# Patient Record
Sex: MALE | Race: WHITE | NOT HISPANIC OR LATINO | Employment: FULL TIME | URBAN - METROPOLITAN AREA
[De-identification: names, ages, dates, MRNs, and addresses within clinical notes are randomized per-mention and may not be internally consistent; named-entity substitution may affect disease eponyms.]

---

## 2017-01-21 ENCOUNTER — PATIENT MESSAGE (OUTPATIENT)
Dept: CARDIOLOGY | Facility: CLINIC | Age: 62
End: 2017-01-21

## 2017-01-23 NOTE — TELEPHONE ENCOUNTER
Pt notified that Artorvastatin 20mg was sent to his pharmacy on 11/25/16 w/6 refills. Pt stated that he will contact his pharmacy and will call us if he needs anything.

## 2017-02-06 ENCOUNTER — PATIENT MESSAGE (OUTPATIENT)
Dept: CARDIOLOGY | Facility: CLINIC | Age: 62
End: 2017-02-06

## 2017-02-17 ENCOUNTER — LAB VISIT (OUTPATIENT)
Dept: LAB | Facility: HOSPITAL | Age: 62
End: 2017-02-17
Attending: INTERNAL MEDICINE
Payer: COMMERCIAL

## 2017-02-17 DIAGNOSIS — R93.1 AGATSTON CAC SCORE 200-399: ICD-10-CM

## 2017-02-17 LAB
ALBUMIN SERPL BCP-MCNC: 3.9 G/DL
ALP SERPL-CCNC: 90 U/L
ALT SERPL W/O P-5'-P-CCNC: 24 U/L
AST SERPL-CCNC: 27 U/L
BILIRUB DIRECT SERPL-MCNC: 0.2 MG/DL
BILIRUB SERPL-MCNC: 0.7 MG/DL
CHOLEST/HDLC SERPL: 3.2 {RATIO}
HDL/CHOLESTEROL RATIO: 31.5 %
HDLC SERPL-MCNC: 162 MG/DL
HDLC SERPL-MCNC: 51 MG/DL
LDLC SERPL CALC-MCNC: 77.6 MG/DL
NONHDLC SERPL-MCNC: 111 MG/DL
PROT SERPL-MCNC: 7.2 G/DL
TRIGL SERPL-MCNC: 167 MG/DL

## 2017-02-17 PROCEDURE — 80061 LIPID PANEL: CPT

## 2017-02-17 PROCEDURE — 36415 COLL VENOUS BLD VENIPUNCTURE: CPT | Mod: PO

## 2017-02-17 PROCEDURE — 80076 HEPATIC FUNCTION PANEL: CPT

## 2017-02-20 ENCOUNTER — TELEPHONE (OUTPATIENT)
Dept: CARDIOLOGY | Facility: CLINIC | Age: 62
End: 2017-02-20

## 2017-02-20 NOTE — TELEPHONE ENCOUNTER
----- Message from Shae Mahmood MD sent at 2/17/2017  5:28 PM CST -----  Mr. Schoen,   please review results of your lipd profile.  LDL-C has markedly improved and it is within the goal. However your TG_c are slightly higher than 150 ( upper normal threshold).  I suggest 1000mg Fish Oils at night and decrease carb/starches in your diet.  I would like to see you in a follow up even though initially we have recommended only PCP follow up.  We will make an appointment for you in the next month or two if this is OK with you

## 2017-05-03 ENCOUNTER — PATIENT MESSAGE (OUTPATIENT)
Dept: CARDIOLOGY | Facility: CLINIC | Age: 62
End: 2017-05-03

## 2017-06-15 DIAGNOSIS — R93.1 AGATSTON CAC SCORE 200-399: ICD-10-CM

## 2017-06-15 RX ORDER — ATORVASTATIN CALCIUM 20 MG/1
20 TABLET, FILM COATED ORAL DAILY
Qty: 30 TABLET | Refills: 6 | Status: SHIPPED | OUTPATIENT
Start: 2017-06-15 | End: 2017-09-14 | Stop reason: DRUGHIGH

## 2017-07-24 DIAGNOSIS — I25.10 CORONARY ARTERY DISEASE INVOLVING NATIVE CORONARY ARTERY OF NATIVE HEART WITHOUT ANGINA PECTORIS: Primary | ICD-10-CM

## 2017-07-28 DIAGNOSIS — I25.10 CORONARY ATHEROSCLEROSIS OF UNSPECIFIED TYPE OF VESSEL, NATIVE OR GRAFT: ICD-10-CM

## 2017-07-28 DIAGNOSIS — Z98.61 POSTSURGICAL PERCUTANEOUS TRANSLUMINAL CORONARY ANGIOPLASTY STATUS: Primary | ICD-10-CM

## 2017-08-01 ENCOUNTER — OFFICE VISIT (OUTPATIENT)
Dept: CARDIOLOGY | Facility: CLINIC | Age: 62
End: 2017-08-01
Payer: COMMERCIAL

## 2017-08-01 VITALS
SYSTOLIC BLOOD PRESSURE: 106 MMHG | OXYGEN SATURATION: 97 % | HEART RATE: 61 BPM | WEIGHT: 187.63 LBS | BODY MASS INDEX: 25.41 KG/M2 | HEIGHT: 72 IN | DIASTOLIC BLOOD PRESSURE: 68 MMHG

## 2017-08-01 DIAGNOSIS — I25.10 CORONARY ARTERY DISEASE INVOLVING NATIVE CORONARY ARTERY OF NATIVE HEART WITHOUT ANGINA PECTORIS: ICD-10-CM

## 2017-08-01 DIAGNOSIS — I21.21: ICD-10-CM

## 2017-08-01 PROCEDURE — 99999 PR PBB SHADOW E&M-EST. PATIENT-LVL III: CPT | Mod: PBBFAC,,, | Performed by: INTERNAL MEDICINE

## 2017-08-01 PROCEDURE — 99244 OFF/OP CNSLTJ NEW/EST MOD 40: CPT | Mod: S$GLB,,, | Performed by: INTERNAL MEDICINE

## 2017-08-01 RX ORDER — TICAGRELOR 90 MG/1
90 TABLET ORAL 2 TIMES DAILY
Qty: 180 TABLET | Refills: 5 | Status: SHIPPED | OUTPATIENT
Start: 2017-08-01 | End: 2018-07-17 | Stop reason: ALTCHOICE

## 2017-08-01 RX ORDER — TICAGRELOR 90 MG/1
90 TABLET ORAL 2 TIMES DAILY
Refills: 0 | COMMUNITY
Start: 2017-07-22 | End: 2017-08-01 | Stop reason: SDUPTHER

## 2017-08-01 RX ORDER — METOPROLOL TARTRATE 25 MG/1
25 TABLET, FILM COATED ORAL 2 TIMES DAILY
Refills: 0 | COMMUNITY
Start: 2017-07-22 | End: 2018-01-30 | Stop reason: ALTCHOICE

## 2017-08-01 RX ORDER — ASPIRIN 81 MG/1
81 TABLET ORAL DAILY
Refills: 0 | COMMUNITY
Start: 2017-07-22 | End: 2023-04-05

## 2017-08-01 RX ORDER — LISINOPRIL 20 MG/1
20 TABLET ORAL DAILY
COMMUNITY
End: 2018-06-17 | Stop reason: SDUPTHER

## 2017-08-01 NOTE — PROGRESS NOTES
Subjective:    Patient ID:  Gerard L Schoen III is a 61 y.o. male who presents for evaluation of Coronary Artery Disease      Mr. Schoen had been seen previously for primary prevention. He was found to have a Ca score >200 but < 300. A nuclear stress test last November was negative. In mid [place] he and his wife relaxed on the bed and fell asleep. He had only complaints of heart burn before laying down. She awoke to find him in cardiac arrest. She performed CPR and called 911. He was shocked in the field and resuscitated. Brought to [facility] where he was found to have a CIrc infarct. Underwent primary PCI.Hosptialized for one week. EF reported to be 35% by LV gram.  Since discharge he denies chest pain and pressure. He has no heartburn. No dyspnea or shortness of breath. Denies PND and orthopnea. No edema.      Coronary Artery Disease   Presents for initial visit. The disease course has been improving. The condition has lasted for 3 weeks. Pertinent negatives include no chest pain, dizziness, leg swelling, palpitations, shortness of breath or weight gain. Risk factors include stress. Past treatments include statins and aspirin. Compliance with prior treatments has been good. His past medical history is significant for past myocardial infarction. Past surgical history includes angioplasty and cardiac stents.       Review of Systems   Constitution: Negative for decreased appetite, fever, malaise/fatigue, weight gain and weight loss.   HENT: Negative for congestion, headaches, hoarse voice and sore throat.    Eyes: Negative for blurred vision, vision loss in left eye, vision loss in right eye, visual disturbance and visual halos.   Cardiovascular: Negative for chest pain, claudication, dyspnea on exertion, irregular heartbeat, leg swelling, near-syncope, orthopnea and palpitations.   Respiratory: Negative for cough, shortness of breath and snoring.     Hematologic/Lymphatic: Negative for bleeding problem. Does not bruise/bleed easily.   Skin: Negative for color change and itching.   Musculoskeletal: Negative for falls, muscle cramps and myalgias.   Gastrointestinal: Negative for abdominal pain, change in bowel habit, bowel incontinence, heartburn, nausea and vomiting.   Genitourinary: Negative for flank pain.   Neurological: Positive for difficulty with concentration. Negative for excessive daytime sleepiness, dizziness, focal weakness, light-headedness, loss of balance, sensory change and vertigo.        Short term memory problems   Psychiatric/Behavioral: Negative for depression. The patient does not have insomnia.          /68 (BP Location: Left arm, Patient Position: Sitting, BP Method: Automatic)   Pulse 61   Ht 6' (1.829 m)   Wt 85.1 kg (187 lb 9.8 oz)   SpO2 97%   BMI 25.44 kg/m²     Objective:    Physical Exam   Constitutional: He is oriented to person, place, and time. He appears well-developed and well-nourished.   HENT:   Head: Normocephalic and atraumatic.   Eyes: Pupils are equal, round, and reactive to light.   Neck: Neck supple. No JVD present. Carotid bruit is not present.   Cardiovascular: Normal rate, regular rhythm, S1 normal, S2 normal and normal pulses.   No extrasystoles are present. PMI is not displaced.  Exam reveals no gallop and no friction rub.    No murmur heard.  Pulmonary/Chest: Effort normal and breath sounds normal. No respiratory distress. He has no wheezes. He has no rales.   Abdominal: Soft. Bowel sounds are normal.   Neurological: He is alert and oriented to person, place, and time. He has normal strength. No cranial nerve deficit.   Skin: Skin is warm and dry.   Psychiatric: He has a normal mood and affect. His speech is normal. Thought content normal.     I personally reviewed the angiogram. Total occlusion of the Circ treated with PCI with good result. There is moderate disease 50-60% of the mid LAD and proximal  RCA. LV gram does show moderately depressed LVEF.  I reviewed all the provided hospital records.      Assessment:       1. Coronary artery disease involving native coronary artery of native heart without angina pectoris    2. Myocardial infarction involving left circumflex coronary artery         Plan:       I would continue aspirin and ticagrelor. I explained to the patient and his wife about superior clinical results.  Continue high intensity statin.  Enrollment in cardiac rehab.  Follow up Echo cardiogram to assess LVEF. Not currently on Life Vest.  I advised him to start cardiac rehab prior to return to work on August 14 for this patient who presented after cardiac arrest complicating his MI.

## 2017-08-01 NOTE — PROGRESS NOTES
Cardiology Clinic Note  Reason for Visit: ***  Referring MD:    HPI:   ***    ROS:    Constitution: Negative for fever or chills. Negative for weight loss or gain.   HENT: Negative for sore throat or headaches. Negative for rhinorrhea.  Eyes: Negative for blurred or double vision.   Cardiovascular: See above  Pulmonary: Negative for SOB. Negative for cough.   Gastrointestinal: Negative for abdominal pain. Negative for nausea/ vomiting. Negative for diarrhea.   : Negative for dysuria.   Neurological: Negative for focal weakness or sensory changes.  PMH:     Past Medical History:   Diagnosis Date    GERD (gastroesophageal reflux disease)      Past Surgical History:   Procedure Laterality Date    COLONOSCOPY  2008    diverticulosis     Allergies:   Review of patient's allergies indicates:  No Known Allergies  Medications:     Current Outpatient Prescriptions on File Prior to Visit   Medication Sig Dispense Refill    atorvastatin (LIPITOR) 20 MG tablet TAKE 1 TABLET (20 MG TOTAL) BY MOUTH ONCE DAILY. (Patient taking differently: Take 40 mg by mouth once daily. ) 30 tablet 6    Lactobacillus rhamnosus GG (CULTURELLE) 10 billion cell capsule Take 1 capsule by mouth once daily.      pantoprazole (PROTONIX) 40 MG tablet Take 1 tablet (40 mg total) by mouth 2 (two) times daily. 180 tablet 3     No current facility-administered medications on file prior to visit.      Social History:     Social History   Substance Use Topics    Smoking status: Former Smoker     Types: Cigarettes     Quit date: 2006    Smokeless tobacco: Former User    Alcohol use Yes      Comment: on vacations     Family History:     Family History   Problem Relation Age of Onset    Cancer Father     Cancer Mother      lymphoma    Cancer Sister      lymphoma    Coronary artery disease Paternal Grandfather     Cancer Maternal Grandfather      lung    Colon cancer Neg Hx     Cirrhosis Neg Hx     Celiac disease Neg Hx     Crohn's disease Neg  Hx     Ulcerative colitis Neg Hx     Esophageal cancer Neg Hx     Stomach cancer Neg Hx     Rectal cancer Neg Hx     Liver cancer Neg Hx     Irritable bowel syndrome Neg Hx      Physical Exam:   /68 (BP Location: Left arm, Patient Position: Sitting, BP Method: Automatic)   Pulse 61   Ht 6' (1.829 m)   Wt 85.1 kg (187 lb 9.8 oz)   SpO2 97%   BMI 25.44 kg/m²      Constitutional: No acute distress, conversant  HEENT: Sclera anicteric, Pupils equal, round and reactive to light, extraocular motions intact, Oropharynx clear  Neck: No JVD, no carotid bruits  Cardiovascular: regular rate and rhythm, no murmur, rubs or gallops, normal S1/S2  Pulmonary: Clear to auscultation bilaterally  Abdominal: Abdomen soft, nontender, nondistended, positive bowel sounds  Extremities: No lower extremity edema,   Pulses: 2+ BL Radial, 2+ BL carotid, 2+ BL DP, 2+ BL PT, normal Allens Test BL  Skin: No ecchymosis, erythema, or ulcers  Psych: Alert and oriented x 3, appropriate affect  Neuro: CNII-XII intact, no focal deficits    Labs:     Lab Results   Component Value Date     11/08/2016    K 4.5 11/08/2016     11/08/2016    CO2 28 11/08/2016    BUN 19 11/08/2016    CREATININE 1.1 11/08/2016    ANIONGAP 8 11/08/2016     Lab Results   Component Value Date    AST 27 02/17/2017    ALT 24 02/17/2017    ALKPHOS 90 02/17/2017    BILITOT 0.7 02/17/2017    BILIDIR 0.2 02/17/2017    ALBUMIN 3.9 02/17/2017     Lab Results   Component Value Date    CALCIUM 9.1 11/08/2016     No results found for: BNP, BNPTRIAGEBLO Lab Results   Component Value Date    WBC 4.85 11/08/2016    HGB 14.3 11/08/2016    HCT 43.1 11/08/2016     11/08/2016    GRAN 2.6 11/08/2016    GRAN 53.7 11/08/2016     No results found for: PTT, INR  Lab Results   Component Value Date    CHOL 162 02/17/2017    HDL 51 02/17/2017    LDLCALC 77.6 02/17/2017    TRIG 167 (H) 02/17/2017     No results found for: HGBA1C  Lab Results   Component Value Date    TSH  2.009 11/08/2016          Imaging:    ***     EF   Date Value Ref Range Status   11/26/2014 65 55 - 65        EKG: ***  Assessment:    ***     Plan:   ***    Signed:  Eileen Lewis MD  Interventional Cardiology Fellow  [phone]  8/1/2017 2:44 PM    Follow-up:   Future Appointments  Date Time Provider Department Center   8/3/2017 8:35 AM LAB, APPOINTMENT [place] [facility]   8/3/2017 9:15 AM CPX [facility]   8/8/2017 9:30 AM REHAB, CARDIAC [facility]

## 2017-08-01 NOTE — LETTER
August 1, 2017        Ata Montana MD  [address]  [place] [address]             [facility]  [address]  [facility] [address]  Phone: [phone]   Patient: Gerard L Schoen III   MR Number: [id number]   YOB: 1955   Date of Visit: 8/1/2017       Dear Dr. Montana:    Thank you for referring Gerard Schoen to me for evaluation. Below are the relevant portions of my assessment and plan of care.        1. Coronary artery disease involving native coronary artery of native heart without angina pectoris    2. Myocardial infarction involving left circumflex coronary artery            I would continue aspirin and ticagrelor. I explained to the patient and his wife about superior clinical results.  Continue high intensity statin.  Enrollment in cardiac rehab.  Follow up Echo cardiogram to assess LVEF. Not currently on Life Vest.  I advised him to start cardiac rehab prior to return to work on August 14 for this patient who presented after cardiac arrest complicating his MI.      If you have questions, please do not hesitate to call me. I look forward to following Remberto along with you.    Sincerely,      Morteza Ramos MD           CC  No Recipients

## 2017-08-03 ENCOUNTER — HOSPITAL ENCOUNTER (OUTPATIENT)
Dept: CARDIOLOGY | Facility: CLINIC | Age: 62
Discharge: HOME | End: 2017-08-03
Payer: COMMERCIAL

## 2017-08-03 DIAGNOSIS — I25.10 CORONARY ATHEROSCLEROSIS OF UNSPECIFIED TYPE OF VESSEL, NATIVE OR GRAFT: ICD-10-CM

## 2017-08-03 DIAGNOSIS — Z98.61 POSTSURGICAL PERCUTANEOUS TRANSLUMINAL CORONARY ANGIOPLASTY STATUS: ICD-10-CM

## 2017-08-03 LAB — DIASTOLIC DYSFUNCTION: NO

## 2017-08-03 PROCEDURE — 94621 CARDIOPULM EXERCISE TESTING: CPT | Mod: S$GLB,,, | Performed by: INTERNAL MEDICINE

## 2017-08-07 ENCOUNTER — TELEPHONE (OUTPATIENT)
Dept: CARDIAC REHAB | Facility: CLINIC | Age: 62
End: 2017-08-07

## 2017-08-08 ENCOUNTER — CLINICAL SUPPORT (OUTPATIENT)
Dept: CARDIAC REHAB | Facility: CLINIC | Age: 62
End: 2017-08-08
Payer: COMMERCIAL

## 2017-08-08 VITALS
OXYGEN SATURATION: 98 % | SYSTOLIC BLOOD PRESSURE: 122 MMHG | DIASTOLIC BLOOD PRESSURE: 68 MMHG | HEART RATE: 55 BPM | RESPIRATION RATE: 18 BRPM

## 2017-08-08 DIAGNOSIS — Z98.61 POSTSURGICAL PERCUTANEOUS TRANSLUMINAL CORONARY ANGIOPLASTY STATUS: ICD-10-CM

## 2017-08-08 DIAGNOSIS — I25.10 CORONARY ARTERY DISEASE INVOLVING NATIVE CORONARY ARTERY OF NATIVE HEART WITHOUT ANGINA PECTORIS: ICD-10-CM

## 2017-08-08 DIAGNOSIS — I25.10 CORONARY ATHEROSCLEROSIS OF UNSPECIFIED TYPE OF VESSEL, NATIVE OR GRAFT: ICD-10-CM

## 2017-08-08 PROCEDURE — 99999 PR PBB SHADOW E&M-EST. PATIENT-LVL III: CPT | Mod: PBBFAC,,,

## 2017-08-08 PROCEDURE — 93798 PHYS/QHP OP CAR RHAB W/ECG: CPT | Mod: S$GLB,,, | Performed by: INTERNAL MEDICINE

## 2017-08-08 NOTE — PROGRESS NOTES
Patient here for Phase II Cardiac Rehab orientation accompanied by his wife.      Discussed QOL, Risk factors & goals with patient.  Head to toe nursing assessment done.  Vital signs obtained.  No edema noted bilaterally.  Patient reports to have some fatigue & is currently not exercising.    QOL:  Discussed Vinnie & SF36 Questionnaire scores with patient.  He reports to have good family support & reports minimal stress at present time.  He states that he does experience some stress at work, but is currently not back to work as of now.  Patient has been instructed to notify staff in the event that circumstances change or stress increases.  Patient verbalizes understanding.    RISK FACTORS:  The following risk factors are present:  hyperlipidemia, hypertension, positive family history, sedentary lifestyle, stress    GOALS:  Patient has set the following goals:  Decrease cholesterol level  Increase exercise tolerance  Increase knowledge of CAD  ID & manage personal areas of stress    Discussed Cardiac Rehab program in depth with patient.  Medication list updated per patient & marked as reviewed.  Patient has been instructed to notify staff of any problems while attending rehab (ie: chest pain, shortness of breath, lightheadedness, dizziness).  Patient has been instructed to monitor blood pressure readings outside of rehab & to keep a daily log of the readings.  Patient verbalizes understanding.    Kathia Brown RN  Cardiac Rehab Nurse

## 2017-08-08 NOTE — PROGRESS NOTES
Exercise:  Met with the patient for orientation.  Consent forms were signed, entry CPX test results were reviewed, proper attire and shoes were discussed, and strength assessment was performed.         Entry CPX test results included weight (185 lb), abdominal girth (39.5 in), BMI (25.09), and body composition (21.8%).  An estimated MET Level of 12.0 and a Peak VO2 of 24.6ml/kg/min (7.0 actual METS) were measured.  This places the patient in the low risk category.  Upon exit CPX an estimated MET Level of 15.6 will be desired to achieve the goal of a 30% improvement.     Based on entry CPX test, the target heart rate range for patient is 95 to 114 bpm.  The patient will attend cardiac rehab class 3 times per week which will include both aerobic and resistance training which will be modified to fit limitations.  Aerobic exercise will be 30-60 minutes with a goal intensity of 12-15 on the RPE scale.  Resistance training will incorporate free weights 1-2 sets, 10-15 repetitions with a beginning weight of 5 to 10 pounds based on strength assessment.    There were no limitations noted by patient.  The patient stated he is currently walking in his neighborhood 5 days/week for 60 minutes.  Patient was encouraged to continue exercising aerobically and to prepare to exercise at least two additional days per week outside of attending cardiac rehab class for a minimum of 30 minutes.  The patient stated understanding.      The patient will begin Cardiac Rehab on Wednesday, August 16 at 7:00am.    Exercise prescription will be adjusted based on tolerance of exercise intensity by patient.    Jose Martin Romero., CEP

## 2017-08-08 NOTE — PROGRESS NOTES
Met with Gerard L Schoen III for orientation to Phase II cardiac rehab.      Weight: 185 lbs  BMI: 25.09  Abdominal girth: 39.5 inches  Body fat: 21.8%    Pt's goal weight is ~180 lbs.    Labs reviewed with patient. Patient confirms he is taking atorvastatin for cholesterol control.    Lab Results   Component Value Date    CHOL 129 08/03/2017     Lab Results   Component Value Date    HDL 45 08/03/2017     Lab Results   Component Value Date    LDLCALC 58.0 (L) 08/03/2017     Lab Results   Component Value Date    TRIG 130 08/03/2017     Lab Results   Component Value Date    CHOLHDL 34.9 08/03/2017         Lab Results   Component Value Date    GLUF 95 08/03/2017     No results found for: HGBA1C    Vitamins/supplements: none    Patient eats 2 meals daily-usually skips lunch.  Pt may prepare breakfast only at home. Pt eats out for most meals. Denies use of a salt shaker at the table on prepared foods. Dines out at least meals per week at restaurants such as [employer] and [employer].  Chooses fried foods 1x/week. Pt does not eat vegetables daily-will eat vegetables grilled or salads. Pt does not like fish. Beverages include water, milk.  Alcohol: beer occasionally.    3-day food record given to patient to complete and return for nutritional assessment.  Will follow Gerard L Schoen III while enrolled in Phase II cardiac rehab and encourage compliance to 2 gram sodium, Mediterranean style eating.      Yecenia Baez MS, RD, LDN

## 2017-08-16 ENCOUNTER — CLINICAL SUPPORT (OUTPATIENT)
Dept: CARDIAC REHAB | Facility: CLINIC | Age: 62
End: 2017-08-16
Payer: COMMERCIAL

## 2017-08-16 DIAGNOSIS — Z98.61 POSTSURGICAL PERCUTANEOUS TRANSLUMINAL CORONARY ANGIOPLASTY STATUS: ICD-10-CM

## 2017-08-16 DIAGNOSIS — I25.10 CORONARY ATHEROSCLEROSIS OF UNSPECIFIED TYPE OF VESSEL, NATIVE OR GRAFT: ICD-10-CM

## 2017-08-16 PROCEDURE — 93798 PHYS/QHP OP CAR RHAB W/ECG: CPT | Mod: S$GLB,,, | Performed by: INTERNAL MEDICINE

## 2017-08-18 ENCOUNTER — CLINICAL SUPPORT (OUTPATIENT)
Dept: CARDIAC REHAB | Facility: CLINIC | Age: 62
End: 2017-08-18
Payer: COMMERCIAL

## 2017-08-18 DIAGNOSIS — I25.10 CORONARY ATHEROSCLEROSIS OF UNSPECIFIED TYPE OF VESSEL, NATIVE OR GRAFT: ICD-10-CM

## 2017-08-18 DIAGNOSIS — Z98.61 POSTSURGICAL PERCUTANEOUS TRANSLUMINAL CORONARY ANGIOPLASTY STATUS: ICD-10-CM

## 2017-08-18 PROCEDURE — 93798 PHYS/QHP OP CAR RHAB W/ECG: CPT | Mod: S$GLB,,, | Performed by: INTERNAL MEDICINE

## 2017-08-21 ENCOUNTER — CLINICAL SUPPORT (OUTPATIENT)
Dept: CARDIAC REHAB | Facility: CLINIC | Age: 62
End: 2017-08-21
Payer: COMMERCIAL

## 2017-08-21 DIAGNOSIS — Z98.61 POSTSURGICAL PERCUTANEOUS TRANSLUMINAL CORONARY ANGIOPLASTY STATUS: ICD-10-CM

## 2017-08-21 DIAGNOSIS — I25.10 CORONARY ATHEROSCLEROSIS OF UNSPECIFIED TYPE OF VESSEL, NATIVE OR GRAFT: ICD-10-CM

## 2017-08-21 PROCEDURE — 93798 PHYS/QHP OP CAR RHAB W/ECG: CPT | Mod: S$GLB,,, | Performed by: INTERNAL MEDICINE

## 2017-08-23 ENCOUNTER — CLINICAL SUPPORT (OUTPATIENT)
Dept: CARDIAC REHAB | Facility: CLINIC | Age: 62
End: 2017-08-23
Payer: COMMERCIAL

## 2017-08-23 DIAGNOSIS — I25.10 CORONARY ATHEROSCLEROSIS OF UNSPECIFIED TYPE OF VESSEL, NATIVE OR GRAFT: ICD-10-CM

## 2017-08-23 DIAGNOSIS — Z98.61 POSTSURGICAL PERCUTANEOUS TRANSLUMINAL CORONARY ANGIOPLASTY STATUS: ICD-10-CM

## 2017-08-23 PROCEDURE — 93798 PHYS/QHP OP CAR RHAB W/ECG: CPT | Mod: S$GLB,,, | Performed by: INTERNAL MEDICINE

## 2017-08-25 ENCOUNTER — CLINICAL SUPPORT (OUTPATIENT)
Dept: CARDIAC REHAB | Facility: CLINIC | Age: 62
End: 2017-08-25
Payer: COMMERCIAL

## 2017-08-25 DIAGNOSIS — I25.10 CORONARY ATHEROSCLEROSIS OF UNSPECIFIED TYPE OF VESSEL, NATIVE OR GRAFT: ICD-10-CM

## 2017-08-25 DIAGNOSIS — Z98.61 POSTSURGICAL PERCUTANEOUS TRANSLUMINAL CORONARY ANGIOPLASTY STATUS: ICD-10-CM

## 2017-08-25 PROCEDURE — 93798 PHYS/QHP OP CAR RHAB W/ECG: CPT | Mod: S$GLB,,, | Performed by: INTERNAL MEDICINE

## 2017-08-28 ENCOUNTER — CLINICAL SUPPORT (OUTPATIENT)
Dept: CARDIAC REHAB | Facility: CLINIC | Age: 62
End: 2017-08-28
Payer: COMMERCIAL

## 2017-08-28 DIAGNOSIS — I25.10 CORONARY ATHEROSCLEROSIS OF UNSPECIFIED TYPE OF VESSEL, NATIVE OR GRAFT: ICD-10-CM

## 2017-08-28 DIAGNOSIS — Z98.61 POSTSURGICAL PERCUTANEOUS TRANSLUMINAL CORONARY ANGIOPLASTY STATUS: ICD-10-CM

## 2017-08-28 PROCEDURE — 93798 PHYS/QHP OP CAR RHAB W/ECG: CPT | Mod: S$GLB,,, | Performed by: INTERNAL MEDICINE

## 2017-08-30 ENCOUNTER — CLINICAL SUPPORT (OUTPATIENT)
Dept: CARDIAC REHAB | Facility: CLINIC | Age: 62
End: 2017-08-30
Payer: COMMERCIAL

## 2017-08-30 DIAGNOSIS — I25.10 CORONARY ATHEROSCLEROSIS OF UNSPECIFIED TYPE OF VESSEL, NATIVE OR GRAFT: ICD-10-CM

## 2017-08-30 DIAGNOSIS — Z98.61 POSTSURGICAL PERCUTANEOUS TRANSLUMINAL CORONARY ANGIOPLASTY STATUS: ICD-10-CM

## 2017-08-30 PROCEDURE — 93798 PHYS/QHP OP CAR RHAB W/ECG: CPT | Mod: S$GLB,,, | Performed by: INTERNAL MEDICINE

## 2017-09-01 ENCOUNTER — CLINICAL SUPPORT (OUTPATIENT)
Dept: CARDIAC REHAB | Facility: CLINIC | Age: 62
End: 2017-09-01
Payer: COMMERCIAL

## 2017-09-01 DIAGNOSIS — I25.10 CORONARY ATHEROSCLEROSIS OF UNSPECIFIED TYPE OF VESSEL, NATIVE OR GRAFT: ICD-10-CM

## 2017-09-01 DIAGNOSIS — Z98.61 POSTSURGICAL PERCUTANEOUS TRANSLUMINAL CORONARY ANGIOPLASTY STATUS: ICD-10-CM

## 2017-09-01 PROCEDURE — 93798 PHYS/QHP OP CAR RHAB W/ECG: CPT | Mod: S$GLB,,, | Performed by: INTERNAL MEDICINE

## 2017-09-06 ENCOUNTER — CLINICAL SUPPORT (OUTPATIENT)
Dept: CARDIAC REHAB | Facility: CLINIC | Age: 62
End: 2017-09-06
Payer: COMMERCIAL

## 2017-09-06 DIAGNOSIS — I25.10 CORONARY ATHEROSCLEROSIS OF UNSPECIFIED TYPE OF VESSEL, NATIVE OR GRAFT: ICD-10-CM

## 2017-09-06 DIAGNOSIS — Z98.61 POSTSURGICAL PERCUTANEOUS TRANSLUMINAL CORONARY ANGIOPLASTY STATUS: ICD-10-CM

## 2017-09-06 PROCEDURE — 93798 PHYS/QHP OP CAR RHAB W/ECG: CPT | Mod: S$GLB,,, | Performed by: INTERNAL MEDICINE

## 2017-09-08 ENCOUNTER — CLINICAL SUPPORT (OUTPATIENT)
Dept: CARDIAC REHAB | Facility: CLINIC | Age: 62
End: 2017-09-08
Payer: COMMERCIAL

## 2017-09-08 DIAGNOSIS — Z98.61 POSTSURGICAL PERCUTANEOUS TRANSLUMINAL CORONARY ANGIOPLASTY STATUS: ICD-10-CM

## 2017-09-08 DIAGNOSIS — I25.10 CORONARY ATHEROSCLEROSIS OF UNSPECIFIED TYPE OF VESSEL, NATIVE OR GRAFT: ICD-10-CM

## 2017-09-08 PROCEDURE — 93798 PHYS/QHP OP CAR RHAB W/ECG: CPT | Mod: S$GLB,,, | Performed by: INTERNAL MEDICINE

## 2017-09-11 ENCOUNTER — CLINICAL SUPPORT (OUTPATIENT)
Dept: CARDIAC REHAB | Facility: CLINIC | Age: 62
End: 2017-09-11
Payer: COMMERCIAL

## 2017-09-11 DIAGNOSIS — Z98.61 POSTSURGICAL PERCUTANEOUS TRANSLUMINAL CORONARY ANGIOPLASTY STATUS: ICD-10-CM

## 2017-09-11 DIAGNOSIS — I25.10 CORONARY ATHEROSCLEROSIS OF UNSPECIFIED TYPE OF VESSEL, NATIVE OR GRAFT: ICD-10-CM

## 2017-09-11 PROCEDURE — 93798 PHYS/QHP OP CAR RHAB W/ECG: CPT | Mod: S$GLB,,, | Performed by: INTERNAL MEDICINE

## 2017-09-13 ENCOUNTER — CLINICAL SUPPORT (OUTPATIENT)
Dept: CARDIAC REHAB | Facility: CLINIC | Age: 62
End: 2017-09-13
Payer: COMMERCIAL

## 2017-09-13 DIAGNOSIS — I25.10 CORONARY ATHEROSCLEROSIS OF UNSPECIFIED TYPE OF VESSEL, NATIVE OR GRAFT: ICD-10-CM

## 2017-09-13 DIAGNOSIS — Z98.61 POSTSURGICAL PERCUTANEOUS TRANSLUMINAL CORONARY ANGIOPLASTY STATUS: ICD-10-CM

## 2017-09-13 PROCEDURE — 93798 PHYS/QHP OP CAR RHAB W/ECG: CPT | Mod: S$GLB,,, | Performed by: INTERNAL MEDICINE

## 2017-09-14 RX ORDER — ATORVASTATIN CALCIUM 40 MG/1
40 TABLET, FILM COATED ORAL DAILY
Qty: 90 TABLET | Refills: 3 | Status: SHIPPED | OUTPATIENT
Start: 2017-09-14 | End: 2017-10-19 | Stop reason: SDUPTHER

## 2017-09-14 RX ORDER — ATORVASTATIN CALCIUM 40 MG/1
40 TABLET, FILM COATED ORAL DAILY
COMMUNITY
End: 2017-09-14 | Stop reason: SDUPTHER

## 2017-09-15 ENCOUNTER — CLINICAL SUPPORT (OUTPATIENT)
Dept: CARDIAC REHAB | Facility: CLINIC | Age: 62
End: 2017-09-15
Payer: COMMERCIAL

## 2017-09-15 DIAGNOSIS — Z98.61 S/P PTCA (PERCUTANEOUS TRANSLUMINAL CORONARY ANGIOPLASTY): ICD-10-CM

## 2017-09-15 DIAGNOSIS — I25.10 CORONARY ATHEROSCLEROSIS OF UNSPECIFIED TYPE OF VESSEL, NATIVE OR GRAFT: ICD-10-CM

## 2017-09-15 PROCEDURE — 93798 PHYS/QHP OP CAR RHAB W/ECG: CPT | Mod: S$GLB,,, | Performed by: INTERNAL MEDICINE

## 2017-09-18 ENCOUNTER — CLINICAL SUPPORT (OUTPATIENT)
Dept: CARDIAC REHAB | Facility: CLINIC | Age: 62
End: 2017-09-18
Payer: COMMERCIAL

## 2017-09-18 DIAGNOSIS — I25.10 CORONARY ATHEROSCLEROSIS OF UNSPECIFIED TYPE OF VESSEL, NATIVE OR GRAFT: ICD-10-CM

## 2017-09-18 DIAGNOSIS — Z98.61 POSTSURGICAL PERCUTANEOUS TRANSLUMINAL CORONARY ANGIOPLASTY STATUS: ICD-10-CM

## 2017-09-18 PROCEDURE — 93798 PHYS/QHP OP CAR RHAB W/ECG: CPT | Mod: S$GLB,,, | Performed by: INTERNAL MEDICINE

## 2017-09-22 ENCOUNTER — CLINICAL SUPPORT (OUTPATIENT)
Dept: CARDIAC REHAB | Facility: CLINIC | Age: 62
End: 2017-09-22
Payer: COMMERCIAL

## 2017-09-22 DIAGNOSIS — I25.10 CORONARY ATHEROSCLEROSIS OF UNSPECIFIED TYPE OF VESSEL, NATIVE OR GRAFT: ICD-10-CM

## 2017-09-22 DIAGNOSIS — Z98.61 POSTSURGICAL PERCUTANEOUS TRANSLUMINAL CORONARY ANGIOPLASTY STATUS: ICD-10-CM

## 2017-09-22 PROCEDURE — 93798 PHYS/QHP OP CAR RHAB W/ECG: CPT | Mod: S$GLB,,, | Performed by: INTERNAL MEDICINE

## 2017-09-22 NOTE — PROGRESS NOTES
Pt states he was on a recent trip and had Sx's of indigestion and pain in his left arm. Which felt like he had previously with his MI. Patient states he feels a lot better and would like to participate in cardiac rehab.  Patient tolerated exercise session well with no complaints

## 2017-09-25 ENCOUNTER — CLINICAL SUPPORT (OUTPATIENT)
Dept: CARDIAC REHAB | Facility: CLINIC | Age: 62
End: 2017-09-25
Payer: COMMERCIAL

## 2017-09-25 DIAGNOSIS — Z98.61 POSTSURGICAL PERCUTANEOUS TRANSLUMINAL CORONARY ANGIOPLASTY STATUS: ICD-10-CM

## 2017-09-25 DIAGNOSIS — I25.10 CORONARY ATHEROSCLEROSIS OF UNSPECIFIED TYPE OF VESSEL, NATIVE OR GRAFT: ICD-10-CM

## 2017-09-25 PROCEDURE — 93798 PHYS/QHP OP CAR RHAB W/ECG: CPT | Mod: S$GLB,,, | Performed by: INTERNAL MEDICINE

## 2017-09-26 ENCOUNTER — OFFICE VISIT (OUTPATIENT)
Dept: CARDIOLOGY | Facility: CLINIC | Age: 62
End: 2017-09-26
Payer: COMMERCIAL

## 2017-09-26 VITALS
DIASTOLIC BLOOD PRESSURE: 71 MMHG | WEIGHT: 192.25 LBS | BODY MASS INDEX: 26.91 KG/M2 | HEART RATE: 58 BPM | HEIGHT: 71 IN | SYSTOLIC BLOOD PRESSURE: 113 MMHG

## 2017-09-26 DIAGNOSIS — I25.118 CORONARY ARTERY DISEASE OF NATIVE ARTERY OF NATIVE HEART WITH STABLE ANGINA PECTORIS: Primary | ICD-10-CM

## 2017-09-26 DIAGNOSIS — K21.9 GASTROESOPHAGEAL REFLUX DISEASE, ESOPHAGITIS PRESENCE NOT SPECIFIED: ICD-10-CM

## 2017-09-26 DIAGNOSIS — I21.21: ICD-10-CM

## 2017-09-26 PROCEDURE — 3008F BODY MASS INDEX DOCD: CPT | Mod: S$GLB,,, | Performed by: INTERNAL MEDICINE

## 2017-09-26 PROCEDURE — 93000 ELECTROCARDIOGRAM COMPLETE: CPT | Mod: S$GLB,,, | Performed by: INTERNAL MEDICINE

## 2017-09-26 PROCEDURE — 99999 PR PBB SHADOW E&M-EST. PATIENT-LVL III: CPT | Mod: PBBFAC,,, | Performed by: INTERNAL MEDICINE

## 2017-09-26 PROCEDURE — 99214 OFFICE O/P EST MOD 30 MIN: CPT | Mod: S$GLB,,, | Performed by: INTERNAL MEDICINE

## 2017-09-26 NOTE — LETTER
September 26, 2017      Ata Montana MD  [address]  [place] [address]           [facility]  [address]  [facility] [address]  Phone: [phone]          Patient: Gerard L Schoen III   MR Number: [id number]   YOB: 1955   Date of Visit: 9/26/2017       Dear No ref. provider found:    Thank you for referring Gerard Schoen to me for evaluation. Attached you will find relevant portions of my assessment and plan of care.    If you have questions, please do not hesitate to call me. I look forward to following Gerard Schoen along with you.    Sincerely,    Morteza Ramos MD    Enclosure  CC:  No Recipients    If you would like to receive this communication electronically, please contact [email or web address] or [phone] to request more information on [facility] access.    For providers and/or their staff who would like to refer a patient to [facility], please contact us through our one-stop-shop provider referral line, Tessa Hall, at [phone].    If you feel you have received this communication in error or would no longer like to receive these types of communications, please e-mail [email or web address]

## 2017-09-27 ENCOUNTER — TELEPHONE (OUTPATIENT)
Dept: CARDIOLOGY | Facility: CLINIC | Age: 62
End: 2017-09-27

## 2017-09-27 ENCOUNTER — CLINICAL SUPPORT (OUTPATIENT)
Dept: CARDIAC REHAB | Facility: CLINIC | Age: 62
End: 2017-09-27
Payer: COMMERCIAL

## 2017-09-27 ENCOUNTER — CLINICAL SUPPORT (OUTPATIENT)
Dept: CARDIOLOGY | Facility: CLINIC | Age: 62
End: 2017-09-27
Payer: COMMERCIAL

## 2017-09-27 DIAGNOSIS — I25.118 CORONARY ARTERY DISEASE OF NATIVE ARTERY OF NATIVE HEART WITH STABLE ANGINA PECTORIS: ICD-10-CM

## 2017-09-27 DIAGNOSIS — I25.10 CORONARY ATHEROSCLEROSIS OF UNSPECIFIED TYPE OF VESSEL, NATIVE OR GRAFT: ICD-10-CM

## 2017-09-27 DIAGNOSIS — Z98.61 POSTSURGICAL PERCUTANEOUS TRANSLUMINAL CORONARY ANGIOPLASTY STATUS: ICD-10-CM

## 2017-09-27 LAB
DIASTOLIC DYSFUNCTION: NO
RETIRED EF AND QEF - SEE NOTES: 60 (ref 55–65)

## 2017-09-27 PROCEDURE — 93321 DOPPLER ECHO F-UP/LMTD STD: CPT | Mod: S$GLB,,, | Performed by: INTERNAL MEDICINE

## 2017-09-27 PROCEDURE — 93351 STRESS TTE COMPLETE: CPT | Mod: S$GLB,,, | Performed by: INTERNAL MEDICINE

## 2017-09-27 PROCEDURE — 93798 PHYS/QHP OP CAR RHAB W/ECG: CPT | Mod: S$GLB,,, | Performed by: INTERNAL MEDICINE

## 2017-09-27 NOTE — PROGRESS NOTES
Subjective:    Patient ID:  Gerard L Schoen III is a 62 y.o. male who presents for follow-up of Coronary Artery Disease      Mr. Schoen has been doing well in cardiac rehab. He reports that he has had no exercise related chest discomfort. He went on his golfing trip to [place] last week. He rode the golf cart. He noted excessive belching and flatulence during this trip. He felt some radiation of this to his throat which felt a bit tight. He also noted radiation to his left elbow. It is reminiscent of symptoms he had prior to his arrest. Became worse Thursday last week. But none over the weekend. Went to rehab and exercised without discomfort. He particularly noted that these symptoms would occur after the exercise last week rather than during it.      Coronary Artery Disease   Presents for follow-up visit. Pertinent negatives include no chest pain, dizziness, leg swelling, palpitations, shortness of breath or weight gain. The symptoms have been worsening. Compliance with diet is variable. Compliance with exercise is good. Compliance with medications is good.       Review of Systems   Constitution: Negative for decreased appetite, fever, malaise/fatigue, weight gain and weight loss.   HENT: Negative for congestion, hoarse voice and sore throat.    Eyes: Negative for blurred vision, vision loss in left eye, vision loss in right eye, visual disturbance and visual halos.   Cardiovascular: Negative for chest pain, claudication, dyspnea on exertion, irregular heartbeat, leg swelling, near-syncope, orthopnea and palpitations.   Respiratory: Negative for cough, shortness of breath and snoring.    Hematologic/Lymphatic: Negative for bleeding problem. Does not bruise/bleed easily.   Skin: Negative for color change and itching.   Musculoskeletal: Negative for falls, muscle cramps and myalgias.   Gastrointestinal: Negative for abdominal pain, change in bowel habit, bowel incontinence, heartburn, nausea and vomiting.  "  Genitourinary: Negative for flank pain.   Neurological: Negative for excessive daytime sleepiness, dizziness, focal weakness, headaches, light-headedness, loss of balance, sensory change and vertigo.   Psychiatric/Behavioral: Negative for depression. The patient does not have insomnia.          /71 (BP Location: Left arm, Patient Position: Sitting, BP Method: Medium (Automatic))   Pulse (!) 58   Ht 5' 11" (1.803 m)   Wt 87.2 kg (192 lb 3.9 oz)   BMI 26.81 kg/m²     Objective:    Physical Exam   Constitutional: He is oriented to person, place, and time. He appears well-developed and well-nourished.   HENT:   Head: Normocephalic and atraumatic.   Eyes: Pupils are equal, round, and reactive to light.   Neck: Neck supple. No JVD present. Carotid bruit is not present.   Cardiovascular: Normal rate, regular rhythm, S1 normal, S2 normal and normal pulses.   No extrasystoles are present. PMI is not displaced.  Exam reveals no gallop and no friction rub.    No murmur heard.  Pulmonary/Chest: Effort normal and breath sounds normal. No respiratory distress. He has no wheezes. He has no rales.   Abdominal: Soft. Bowel sounds are normal.   Neurological: He is alert and oriented to person, place, and time. He has normal strength. No cranial nerve deficit.   Skin: Skin is warm and dry.   Psychiatric: He has a normal mood and affect. His speech is normal. Thought content normal.         Assessment:       1. Coronary artery disease of native artery of native heart with stable angina pectoris    2. Myocardial infarction involving left circumflex coronary artery    3. Gastroesophageal reflux disease, esophagitis presence not specified         Plan:     Unclear if this represents anginal pain as it is not occcurring during exercise. He takes H1 blocker for GERD which is not helping it. Will obtain stress echo. If ischemia is present will need repeat cath. He had Circ stent post arrest.  If EF is depressed he may need to be " referred for ICD. EF was 30% July 15th after his arrest and PCI.  Continue current medications.   Return in about 6 months (around 3/26/2018).

## 2017-09-29 ENCOUNTER — CLINICAL SUPPORT (OUTPATIENT)
Dept: CARDIAC REHAB | Facility: CLINIC | Age: 62
End: 2017-09-29
Payer: COMMERCIAL

## 2017-09-29 DIAGNOSIS — Z98.61 POSTSURGICAL PERCUTANEOUS TRANSLUMINAL CORONARY ANGIOPLASTY STATUS: ICD-10-CM

## 2017-09-29 DIAGNOSIS — I25.10 CORONARY ATHEROSCLEROSIS OF UNSPECIFIED TYPE OF VESSEL, NATIVE OR GRAFT: ICD-10-CM

## 2017-09-29 PROCEDURE — 93798 PHYS/QHP OP CAR RHAB W/ECG: CPT | Mod: S$GLB,,, | Performed by: INTERNAL MEDICINE

## 2017-10-02 ENCOUNTER — CLINICAL SUPPORT (OUTPATIENT)
Dept: CARDIAC REHAB | Facility: CLINIC | Age: 62
End: 2017-10-02
Payer: COMMERCIAL

## 2017-10-02 DIAGNOSIS — I25.10 CORONARY ARTERY DISEASE, ANGINA PRESENCE UNSPECIFIED, UNSPECIFIED VESSEL OR LESION TYPE, UNSPECIFIED WHETHER NATIVE OR TRANSPLANTED HEART: ICD-10-CM

## 2017-10-02 DIAGNOSIS — Z98.61 POSTSURGICAL PERCUTANEOUS TRANSLUMINAL CORONARY ANGIOPLASTY STATUS: ICD-10-CM

## 2017-10-02 PROCEDURE — 93798 PHYS/QHP OP CAR RHAB W/ECG: CPT | Mod: S$GLB,,, | Performed by: INTERNAL MEDICINE

## 2017-10-04 ENCOUNTER — CLINICAL SUPPORT (OUTPATIENT)
Dept: CARDIAC REHAB | Facility: CLINIC | Age: 62
End: 2017-10-04
Payer: COMMERCIAL

## 2017-10-04 DIAGNOSIS — I25.10 CORONARY ATHEROSCLEROSIS OF NATIVE CORONARY ARTERY: ICD-10-CM

## 2017-10-04 DIAGNOSIS — Z98.61 POSTSURGICAL PERCUTANEOUS TRANSLUMINAL CORONARY ANGIOPLASTY STATUS: ICD-10-CM

## 2017-10-04 PROCEDURE — 93798 PHYS/QHP OP CAR RHAB W/ECG: CPT | Mod: S$GLB,,, | Performed by: INTERNAL MEDICINE

## 2017-10-05 ENCOUNTER — OFFICE VISIT (OUTPATIENT)
Dept: GASTROENTEROLOGY | Facility: CLINIC | Age: 62
End: 2017-10-05
Payer: COMMERCIAL

## 2017-10-05 VITALS
DIASTOLIC BLOOD PRESSURE: 62 MMHG | BODY MASS INDEX: 27.26 KG/M2 | SYSTOLIC BLOOD PRESSURE: 90 MMHG | HEIGHT: 71 IN | WEIGHT: 194.69 LBS

## 2017-10-05 DIAGNOSIS — K21.9 GASTROESOPHAGEAL REFLUX DISEASE, ESOPHAGITIS PRESENCE NOT SPECIFIED: Primary | ICD-10-CM

## 2017-10-05 PROCEDURE — 99213 OFFICE O/P EST LOW 20 MIN: CPT | Mod: S$GLB,,, | Performed by: NURSE PRACTITIONER

## 2017-10-05 PROCEDURE — 99999 PR PBB SHADOW E&M-EST. PATIENT-LVL III: CPT | Mod: PBBFAC,,, | Performed by: NURSE PRACTITIONER

## 2017-10-05 RX ORDER — ESOMEPRAZOLE MAGNESIUM 40 MG/1
40 CAPSULE, DELAYED RELEASE ORAL
Qty: 30 CAPSULE | Refills: 3 | Status: SHIPPED | OUTPATIENT
Start: 2017-10-05 | End: 2018-01-30

## 2017-10-05 NOTE — PROGRESS NOTES
Subjective:       Patient ID: Gerard L Schoen III is a 62 y.o. male.    Chief Complaint: Gastroesophageal Reflux    HPI    MI in July with one stent.  Has been on brilinta.  Two months ago reflux began worsening with pain in the throat and arm.  Had cardiac workup at that time which was negative.    Presents today with continued complaints of worsening acid reflux.  He is currently using pantoprazole BID  No nausea or vomiting.  No abdominal pain.  No bowel changes.  Has had increased belching and gas.  Belching may relieve the pain he experiences.  Has made diet changes.     He had EGD 8/2015:  .- Normal esophagus. Biopsied.                        - Erythematous mucosa in the stomach. Biopsied.                        - Duodenitis. Biopsied.                        - Biopsies were taken with a cold forceps for                         evaluation of celiac disease  Colonoscopy 8/2015:  - One 3 mm polyp in the cecum. Resected and                         retrieved.                        - One 2 mm polyp at the hepatic flexure. Resected                         and retrieved.                        - One 3 mm polyp in the proximal transverse colon.                         Resected and retrieved.                        - Internal hemorrhoids.  Pathology:  1. Duodenal bulb, biopsy:  Duodenal mucosa with prominent Brunner's glands, otherwise no significant histopathologic findings  2. Second and third portions of duodenum, biopsy:  Mild chronic peptic duodenitis  3. Stomach, biopsy:  Oxyntic and transitional gastric mucosa with mild chronic inactive gastritis  Negative for Helicobacter pylori on routine stains  Negative for metaplasia, dysplasia and malignancy  4. Esophagus, biopsy:  Squamous esophageal mucosa with no significant histopathologic findings  Small detached fragment of superficial gastric type mucosa  Negative for metaplasia, dysplasia and malignancy.  5. Cecal polyp, biopsy:  Tubular adenoma  6. Hepatic flexure  polyp, biopsy:  Findings consistent with hyperplastic polyp  7. Proximal transverse colon polyp, biopsy:  Tubular adenoma.        Review of Systems   Constitutional: Negative.    HENT: Negative.  Negative for trouble swallowing.    Respiratory: Negative for cough and shortness of breath.    Cardiovascular: Negative for chest pain.   Gastrointestinal: Negative for abdominal distention, abdominal pain, blood in stool, constipation, diarrhea, nausea and vomiting.   Musculoskeletal: Negative.    Skin: Negative.    Neurological: Negative.    Psychiatric/Behavioral: Negative.        Objective:      Physical Exam   Constitutional: He is oriented to person, place, and time. He appears well-developed and well-nourished. No distress.   Eyes: No scleral icterus.   Cardiovascular: Normal rate.    Pulmonary/Chest: Effort normal. No respiratory distress.   Abdominal: Soft. He exhibits no distension.   Musculoskeletal: Normal range of motion.   Neurological: He is alert and oriented to person, place, and time.   Skin: Skin is warm and dry. He is not diaphoretic.   Psychiatric: He has a normal mood and affect. His behavior is normal. Judgment and thought content normal.   Vitals reviewed.      Assessment:       1. Gastroesophageal reflux disease, esophagitis presence not specified        Plan:         Remberto was seen today for gastroesophageal reflux.    Diagnoses and all orders for this visit:    Gastroesophageal reflux disease, esophagitis presence not specified    Other orders  -     esomeprazole (NEXIUM) 40 MG capsule; Take 1 capsule (40 mg total) by mouth before breakfast.         Will change PPI to Nexium and add gaviscon as needed.    Discussed reflux prevention.      If no improvement or new symptoms can consider carafate and/or repeat EGD if clearance to hold brilinta.    Follow up in one month.

## 2017-10-05 NOTE — PATIENT INSTRUCTIONS
Stop pantoprazole and change to Nexium ( I have sent this prescription to your pharmacy).    Add gaviscon as needed. (Over the counter)    If no improvement in 2-3 weeks, contact me for further recommendations.

## 2017-10-09 ENCOUNTER — CLINICAL SUPPORT (OUTPATIENT)
Dept: CARDIAC REHAB | Facility: CLINIC | Age: 62
End: 2017-10-09
Payer: COMMERCIAL

## 2017-10-09 DIAGNOSIS — I25.10 CORONARY ATHEROSCLEROSIS OF NATIVE CORONARY ARTERY: ICD-10-CM

## 2017-10-09 DIAGNOSIS — Z98.61 POSTSURGICAL PERCUTANEOUS TRANSLUMINAL CORONARY ANGIOPLASTY STATUS: ICD-10-CM

## 2017-10-09 PROCEDURE — 93798 PHYS/QHP OP CAR RHAB W/ECG: CPT | Mod: S$GLB,,, | Performed by: INTERNAL MEDICINE

## 2017-10-11 ENCOUNTER — CLINICAL SUPPORT (OUTPATIENT)
Dept: CARDIAC REHAB | Facility: CLINIC | Age: 62
End: 2017-10-11
Payer: COMMERCIAL

## 2017-10-11 DIAGNOSIS — Z98.61 S/P PTCA (PERCUTANEOUS TRANSLUMINAL CORONARY ANGIOPLASTY): ICD-10-CM

## 2017-10-11 DIAGNOSIS — I25.118 CORONARY ARTERY DISEASE OF NATIVE ARTERY OF NATIVE HEART WITH STABLE ANGINA PECTORIS: ICD-10-CM

## 2017-10-11 PROCEDURE — 93798 PHYS/QHP OP CAR RHAB W/ECG: CPT | Mod: S$GLB,,, | Performed by: INTERNAL MEDICINE

## 2017-10-13 ENCOUNTER — CLINICAL SUPPORT (OUTPATIENT)
Dept: CARDIAC REHAB | Facility: CLINIC | Age: 62
End: 2017-10-13
Payer: COMMERCIAL

## 2017-10-13 DIAGNOSIS — I25.10 ATHEROSCLEROSIS OF NATIVE CORONARY ARTERY OF NATIVE HEART WITHOUT ANGINA PECTORIS: ICD-10-CM

## 2017-10-13 DIAGNOSIS — Z98.61 S/P PTCA (PERCUTANEOUS TRANSLUMINAL CORONARY ANGIOPLASTY): ICD-10-CM

## 2017-10-13 PROCEDURE — 93798 PHYS/QHP OP CAR RHAB W/ECG: CPT | Mod: S$GLB,,, | Performed by: INTERNAL MEDICINE

## 2017-10-16 ENCOUNTER — CLINICAL SUPPORT (OUTPATIENT)
Dept: CARDIAC REHAB | Facility: CLINIC | Age: 62
End: 2017-10-16
Payer: COMMERCIAL

## 2017-10-16 DIAGNOSIS — I25.10 ATHEROSCLEROSIS OF NATIVE CORONARY ARTERY OF NATIVE HEART WITHOUT ANGINA PECTORIS: ICD-10-CM

## 2017-10-16 DIAGNOSIS — Z98.61 POSTSURGICAL PERCUTANEOUS TRANSLUMINAL CORONARY ANGIOPLASTY STATUS: ICD-10-CM

## 2017-10-16 PROCEDURE — 93798 PHYS/QHP OP CAR RHAB W/ECG: CPT | Mod: S$GLB,,, | Performed by: INTERNAL MEDICINE

## 2017-10-18 ENCOUNTER — CLINICAL SUPPORT (OUTPATIENT)
Dept: CARDIAC REHAB | Facility: CLINIC | Age: 62
End: 2017-10-18
Payer: COMMERCIAL

## 2017-10-18 DIAGNOSIS — Z98.61 POSTSURGICAL PERCUTANEOUS TRANSLUMINAL CORONARY ANGIOPLASTY STATUS: ICD-10-CM

## 2017-10-18 DIAGNOSIS — I25.10 CORONARY ATHEROSCLEROSIS OF NATIVE CORONARY ARTERY: ICD-10-CM

## 2017-10-18 PROCEDURE — 93798 PHYS/QHP OP CAR RHAB W/ECG: CPT | Mod: S$GLB,,, | Performed by: INTERNAL MEDICINE

## 2017-10-19 DIAGNOSIS — I25.10 CORONARY ARTERY DISEASE INVOLVING NATIVE CORONARY ARTERY OF NATIVE HEART WITHOUT ANGINA PECTORIS: Primary | ICD-10-CM

## 2017-10-19 RX ORDER — ATORVASTATIN CALCIUM 40 MG/1
40 TABLET, FILM COATED ORAL DAILY
Qty: 90 TABLET | Refills: 3 | Status: SHIPPED | OUTPATIENT
Start: 2017-10-19 | End: 2019-02-27 | Stop reason: SDUPTHER

## 2017-10-20 ENCOUNTER — CLINICAL SUPPORT (OUTPATIENT)
Dept: CARDIAC REHAB | Facility: CLINIC | Age: 62
End: 2017-10-20
Payer: COMMERCIAL

## 2017-10-20 DIAGNOSIS — Z98.61 S/P PTCA (PERCUTANEOUS TRANSLUMINAL CORONARY ANGIOPLASTY): ICD-10-CM

## 2017-10-20 DIAGNOSIS — I25.10 ATHEROSCLEROSIS OF NATIVE CORONARY ARTERY OF NATIVE HEART WITHOUT ANGINA PECTORIS: ICD-10-CM

## 2017-10-20 PROCEDURE — 93798 PHYS/QHP OP CAR RHAB W/ECG: CPT | Mod: S$GLB,,, | Performed by: INTERNAL MEDICINE

## 2017-10-23 ENCOUNTER — CLINICAL SUPPORT (OUTPATIENT)
Dept: CARDIAC REHAB | Facility: CLINIC | Age: 62
End: 2017-10-23
Payer: COMMERCIAL

## 2017-10-23 DIAGNOSIS — Z98.61 POSTSURGICAL PERCUTANEOUS TRANSLUMINAL CORONARY ANGIOPLASTY STATUS: ICD-10-CM

## 2017-10-23 DIAGNOSIS — I25.10 ATHEROSCLEROSIS OF NATIVE CORONARY ARTERY OF NATIVE HEART WITHOUT ANGINA PECTORIS: ICD-10-CM

## 2017-10-23 PROCEDURE — 93798 PHYS/QHP OP CAR RHAB W/ECG: CPT | Mod: S$GLB,,, | Performed by: INTERNAL MEDICINE

## 2017-10-25 ENCOUNTER — CLINICAL SUPPORT (OUTPATIENT)
Dept: CARDIAC REHAB | Facility: CLINIC | Age: 62
End: 2017-10-25
Payer: COMMERCIAL

## 2017-10-25 DIAGNOSIS — Z98.61 POSTSURGICAL PERCUTANEOUS TRANSLUMINAL CORONARY ANGIOPLASTY STATUS: ICD-10-CM

## 2017-10-25 DIAGNOSIS — I25.10 CORONARY ATHEROSCLEROSIS OF NATIVE CORONARY ARTERY: ICD-10-CM

## 2017-10-25 DIAGNOSIS — Z98.61 POSTSURGICAL PERCUTANEOUS TRANSLUMINAL CORONARY ANGIOPLASTY STATUS: Primary | ICD-10-CM

## 2017-10-25 PROCEDURE — 93798 PHYS/QHP OP CAR RHAB W/ECG: CPT | Mod: S$GLB,,, | Performed by: INTERNAL MEDICINE

## 2017-11-03 ENCOUNTER — CLINICAL SUPPORT (OUTPATIENT)
Dept: CARDIAC REHAB | Facility: CLINIC | Age: 62
End: 2017-11-03
Payer: COMMERCIAL

## 2017-11-03 DIAGNOSIS — I25.10 CORONARY ATHEROSCLEROSIS OF NATIVE CORONARY ARTERY: ICD-10-CM

## 2017-11-03 DIAGNOSIS — I25.10 ATHEROSCLEROSIS OF NATIVE CORONARY ARTERY OF NATIVE HEART WITHOUT ANGINA PECTORIS: ICD-10-CM

## 2017-11-03 DIAGNOSIS — Z98.61 POSTSURGICAL PERCUTANEOUS TRANSLUMINAL CORONARY ANGIOPLASTY STATUS: ICD-10-CM

## 2017-11-03 DIAGNOSIS — Z98.61 S/P PTCA (PERCUTANEOUS TRANSLUMINAL CORONARY ANGIOPLASTY): Primary | ICD-10-CM

## 2017-11-03 PROCEDURE — 93798 PHYS/QHP OP CAR RHAB W/ECG: CPT | Mod: S$GLB,,, | Performed by: INTERNAL MEDICINE

## 2017-11-06 ENCOUNTER — CLINICAL SUPPORT (OUTPATIENT)
Dept: CARDIAC REHAB | Facility: CLINIC | Age: 62
End: 2017-11-06
Payer: COMMERCIAL

## 2017-11-06 DIAGNOSIS — Z98.61 POSTSURGICAL PERCUTANEOUS TRANSLUMINAL CORONARY ANGIOPLASTY STATUS: ICD-10-CM

## 2017-11-06 DIAGNOSIS — I25.10 CORONARY ATHEROSCLEROSIS OF NATIVE CORONARY ARTERY: ICD-10-CM

## 2017-11-06 PROCEDURE — 93798 PHYS/QHP OP CAR RHAB W/ECG: CPT | Mod: S$GLB,,, | Performed by: INTERNAL MEDICINE

## 2017-11-08 ENCOUNTER — CLINICAL SUPPORT (OUTPATIENT)
Dept: CARDIAC REHAB | Facility: CLINIC | Age: 62
End: 2017-11-08
Payer: COMMERCIAL

## 2017-11-08 DIAGNOSIS — Z98.61 S/P PTCA (PERCUTANEOUS TRANSLUMINAL CORONARY ANGIOPLASTY): ICD-10-CM

## 2017-11-08 DIAGNOSIS — I25.10 ATHEROSCLEROSIS OF NATIVE CORONARY ARTERY OF NATIVE HEART WITHOUT ANGINA PECTORIS: ICD-10-CM

## 2017-11-08 DIAGNOSIS — Z98.61 S/P PERCUTANEOUS TRANSLUMINAL CORONARY ANGIOPLASTY: ICD-10-CM

## 2017-11-08 PROCEDURE — 93798 PHYS/QHP OP CAR RHAB W/ECG: CPT | Mod: S$GLB,,, | Performed by: INTERNAL MEDICINE

## 2017-11-09 ENCOUNTER — OFFICE VISIT (OUTPATIENT)
Dept: GASTROENTEROLOGY | Facility: CLINIC | Age: 62
End: 2017-11-09
Payer: COMMERCIAL

## 2017-11-09 VITALS
SYSTOLIC BLOOD PRESSURE: 99 MMHG | HEIGHT: 71 IN | BODY MASS INDEX: 27.19 KG/M2 | WEIGHT: 194.25 LBS | DIASTOLIC BLOOD PRESSURE: 61 MMHG

## 2017-11-09 DIAGNOSIS — R14.0 FLATULENCE/GAS PAIN/BELCHING: ICD-10-CM

## 2017-11-09 DIAGNOSIS — K30 INDIGESTION: ICD-10-CM

## 2017-11-09 DIAGNOSIS — K21.9 GASTROESOPHAGEAL REFLUX DISEASE, ESOPHAGITIS PRESENCE NOT SPECIFIED: Primary | ICD-10-CM

## 2017-11-09 PROCEDURE — 99213 OFFICE O/P EST LOW 20 MIN: CPT | Mod: S$GLB,,, | Performed by: NURSE PRACTITIONER

## 2017-11-09 PROCEDURE — 99999 PR PBB SHADOW E&M-EST. PATIENT-LVL II: CPT | Mod: PBBFAC,,, | Performed by: NURSE PRACTITIONER

## 2017-11-09 NOTE — PROGRESS NOTES
Subjective:       Patient ID: Gerard L Schoen III is a 62 y.o. male.    Chief Complaint: Follow-up    HPI  Reports pain complaints discussed at last visit have improved.  He is using pantoprazole daily.  He cut out artifical sweeteners and sports drinks.  He continues to have complaints of belching and flatulence.  He denies abdominal pain, nausea, vomiting, reflux, dysphagia, bowel changes, blood with bowel movements or black stools.  He has had EGD and colonoscopy.  He has heart stents in July and when pain complaints began before out last visit he had repeat cardiac workup which was negative.     He reports that food worsens his complaints, but no foods in particular.     Review of Systems   Constitutional: Negative.  Negative for activity change, appetite change, fatigue, fever and unexpected weight change.   HENT: Negative.  Negative for sore throat and trouble swallowing.    Respiratory: Negative.  Negative for cough, choking and shortness of breath.    Cardiovascular: Negative.  Negative for chest pain.   Gastrointestinal: Negative for abdominal distention, abdominal pain, blood in stool, constipation, diarrhea, nausea and vomiting.   Musculoskeletal: Positive for arthralgias. Negative for neck pain and neck stiffness.   Skin: Negative.    Neurological: Negative.  Negative for dizziness, syncope, weakness and light-headedness.   Psychiatric/Behavioral: Negative.        Objective:      Physical Exam   Constitutional: He is oriented to person, place, and time. He appears well-developed and well-nourished. No distress.   Eyes: No scleral icterus.   Cardiovascular: Normal rate.    Pulmonary/Chest: Effort normal. No respiratory distress.   Abdominal: Soft.   Neurological: He is alert and oriented to person, place, and time.   Skin: Skin is warm and dry. He is not diaphoretic.   Psychiatric: He has a normal mood and affect. His behavior is normal. Judgment and thought content normal.   Vitals reviewed.       Assessment:       1. Gastroesophageal reflux disease, esophagitis presence not specified    2. Flatulence/gas pain/belching    3. Indigestion        Plan:     Remberto was seen today for follow-up.    Diagnoses and all orders for this visit:    Gastroesophageal reflux disease, esophagitis presence not specified    Flatulence/gas pain/belching    Indigestion         Add align.  Will consider trial of flagyl if complaints continue for the possibility of SIBO.        Could consider FD sharath or breath testing for lactose intolerance or SIBO.

## 2017-11-10 ENCOUNTER — CLINICAL SUPPORT (OUTPATIENT)
Dept: CARDIAC REHAB | Facility: CLINIC | Age: 62
End: 2017-11-10
Payer: COMMERCIAL

## 2017-11-10 DIAGNOSIS — Z98.61 S/P PTCA (PERCUTANEOUS TRANSLUMINAL CORONARY ANGIOPLASTY): ICD-10-CM

## 2017-11-10 DIAGNOSIS — I25.118 CORONARY ARTERY DISEASE OF NATIVE ARTERY OF NATIVE HEART WITH STABLE ANGINA PECTORIS: ICD-10-CM

## 2017-11-10 PROCEDURE — 93798 PHYS/QHP OP CAR RHAB W/ECG: CPT | Mod: S$GLB,,, | Performed by: INTERNAL MEDICINE

## 2017-11-13 ENCOUNTER — CLINICAL SUPPORT (OUTPATIENT)
Dept: CARDIAC REHAB | Facility: CLINIC | Age: 62
End: 2017-11-13
Payer: COMMERCIAL

## 2017-11-13 DIAGNOSIS — I25.10 CORONARY ATHEROSCLEROSIS OF NATIVE CORONARY ARTERY: ICD-10-CM

## 2017-11-13 DIAGNOSIS — Z98.61 POSTSURGICAL PERCUTANEOUS TRANSLUMINAL CORONARY ANGIOPLASTY STATUS: ICD-10-CM

## 2017-11-13 PROCEDURE — 93798 PHYS/QHP OP CAR RHAB W/ECG: CPT | Mod: S$GLB,,, | Performed by: INTERNAL MEDICINE

## 2017-11-15 ENCOUNTER — CLINICAL SUPPORT (OUTPATIENT)
Dept: CARDIAC REHAB | Facility: CLINIC | Age: 62
End: 2017-11-15
Payer: COMMERCIAL

## 2017-11-15 DIAGNOSIS — Z98.61 S/P PTCA (PERCUTANEOUS TRANSLUMINAL CORONARY ANGIOPLASTY): ICD-10-CM

## 2017-11-15 DIAGNOSIS — I25.10 ATHEROSCLEROSIS OF NATIVE CORONARY ARTERY OF NATIVE HEART WITHOUT ANGINA PECTORIS: ICD-10-CM

## 2017-11-15 PROCEDURE — 93798 PHYS/QHP OP CAR RHAB W/ECG: CPT | Mod: S$GLB,,, | Performed by: INTERNAL MEDICINE

## 2017-11-16 ENCOUNTER — TELEPHONE (OUTPATIENT)
Dept: CARDIOLOGY | Facility: CLINIC | Age: 62
End: 2017-11-16

## 2017-11-16 ENCOUNTER — HOSPITAL ENCOUNTER (OUTPATIENT)
Dept: CARDIOLOGY | Facility: CLINIC | Age: 62
Discharge: HOME | End: 2017-11-16
Attending: INTERNAL MEDICINE
Payer: COMMERCIAL

## 2017-11-16 DIAGNOSIS — Z98.61 S/P PTCA (PERCUTANEOUS TRANSLUMINAL CORONARY ANGIOPLASTY): ICD-10-CM

## 2017-11-16 DIAGNOSIS — I25.10 ATHEROSCLEROSIS OF NATIVE CORONARY ARTERY OF NATIVE HEART WITHOUT ANGINA PECTORIS: ICD-10-CM

## 2017-11-16 LAB — DIASTOLIC DYSFUNCTION: NO

## 2017-11-16 PROCEDURE — 94621 CARDIOPULM EXERCISE TESTING: CPT | Mod: S$GLB,,, | Performed by: INTERNAL MEDICINE

## 2017-11-16 NOTE — TELEPHONE ENCOUNTER
----- Message from Shae Mahmood MD sent at 11/16/2017  4:44 PM CST -----  I think Mr. Schoen is now followed by Dr. Ramos. Please let him know that his CPX was OK.  Dr. Ge already related this message to him

## 2017-11-16 NOTE — TELEPHONE ENCOUNTER
----- Message from Shae Mahmood MD sent at 11/16/2017 10:46 AM CST -----  Please let the patient know that his His CRP was slightly elevated 3 months ago, which is c/w with his recent history of heart attack.  This should get better with time.   I think Dr. Ge is very interested in following this patient, so if he wishes to change cardiologists It will be fine with me

## 2017-11-16 NOTE — TELEPHONE ENCOUNTER
----- Message from Shae Mahmood MD sent at 11/16/2017  4:45 PM CST -----  Please let the patient know that his lipid profile is fine

## 2017-11-20 ENCOUNTER — TELEPHONE (OUTPATIENT)
Dept: CARDIAC REHAB | Facility: CLINIC | Age: 62
End: 2017-11-20

## 2017-11-21 ENCOUNTER — CLINICAL SUPPORT (OUTPATIENT)
Dept: CARDIAC REHAB | Facility: CLINIC | Age: 62
End: 2017-11-21
Payer: COMMERCIAL

## 2017-11-21 DIAGNOSIS — Z98.61 POSTSURGICAL PERCUTANEOUS TRANSLUMINAL CORONARY ANGIOPLASTY STATUS: ICD-10-CM

## 2017-11-21 DIAGNOSIS — I25.10 ATHEROSCLEROSIS OF NATIVE CORONARY ARTERY OF NATIVE HEART WITHOUT ANGINA PECTORIS: ICD-10-CM

## 2017-11-21 PROCEDURE — 99999 PR PBB SHADOW E&M-EST. PATIENT-LVL II: CPT | Mod: PBBFAC,,,

## 2017-11-21 PROCEDURE — 93798 PHYS/QHP OP CAR RHAB W/ECG: CPT | Mod: S$GLB,,, | Performed by: DIETITIAN, REGISTERED

## 2017-11-21 NOTE — PROGRESS NOTES
Met with Gerard L Schoen III for exit to Phase II cardiac rehab.      Weight: 195 lbs  BMI: 26.44  Abdominal girth: 39.5 inches  Body fat: 23.2%    PT did not meet goal weight of 180 lbs.     Labs reviewed with patient. Patient confirms he is taking atorvastatin for cholesterol control.    Lab Results   Component Value Date    CHOL 143 11/16/2017     Lab Results   Component Value Date    HDL 61 11/16/2017     Lab Results   Component Value Date    LDLCALC 67.4 11/16/2017     Lab Results   Component Value Date    TRIG 73 11/16/2017     Lab Results   Component Value Date    CHOLHDL 42.7 11/16/2017         Lab Results   Component Value Date    GLUF 95 08/03/2017     No results found for: HGBA1C    Vitamins/supplements: glutamine, probiotic    Patient eats 3 meals daily.  Pt's wife prepares meals 2x/week at home. Pt reports that he has cut back on red meat and watching sodium. Denies use of a salt shaker at the table on prepared foods. Pt continues to eat out for most meals, however, improved from beginning.Avoiding/cutting back on fried foods. Pt consuming salads and vegetables, however not on daily basis.  Beverages include water, milk.  Alcohol beer occasionally.    Gerard L Schoen III encouraged to contact cardiac rehab staff with any additional questions or concerns regarding diet.      Yecenia Baez MS, RD, LDN

## 2017-11-21 NOTE — PROGRESS NOTES
Patient here for exit interview for Phase II Cardiac rehab accompanied by his wife, Andria.    Discussed with patient pre/post labs, stress tests, QOL, risk factors, goals & home exercise prescription.      QOL:  The following changes were noted on Vinnie & SF36 Questionnaire:  Vinnie:                 PRE:       POST:              SF36         PRE:        POST:  Anxiety                    1              2                                       107             121                                Somatic                  8              5                                       Depression             3              0                                       Hostility                   0              0             Patient denies having any overwhelming stress & reports improvements in situational stress at work.  Patient has been instructed to seek attention via PCP/Psychiatry in the event that circumstances change.  Patient verbalizes understanding.       RISK FACTORS:    Hypertension-well controlled  Sedentary lifestyle-pt is exercising 6 days per week.  Hyperlipidemia-well controlled. Noted improvements in HDL from 45 to 61.  Stress/anxiety-see above note  Obesity-BMI change from 25 to 26.    GOALS:  The following goals have been met:  Decrease cholesterol level  Increase exercise tolerance  Increase knowledge of CAD  ID & manage personal areas of stress    Home exercise prescription discussed with patient.  Patient has been instructed to follow up with Cardiologist.   Information on Medical Fitness Program at [facility] given to patient.  Patient is exercising at home & is currently in the process of getting more equipment for his gym at home.    Kathia Brown RN  Cardiac Rehab Nurse

## 2017-11-21 NOTE — PROGRESS NOTES
Exercise:  Met with the patient for exit interview.  Entry and exit CPX test results were discussed as well as current and future exercise routine.      On entry, an estimated MET Level of 12.0 and a Peak VO2 of 24.6ml/kg/min (7.0 actual METS) were measured.  Upon exit, the patient achieved 15.0 estimated METS on the CPX test and a Peak VO2 of 29.1ml/kg/min (8.3 actual METS).      The goal of a 30% improvement to 15.6 was not quite reached but the patient had significant improvement in VO2max and actual METs.    Based on exit CPX test, the target heart range during aerobic exercise for this patient is 95 to 120 bpm.      Exit CPX test results also included weight (195 lb), abdominal girth (39.5 in), BMI (26.44), and body composition (23.2%).    Patient stated he has continued walking on his treadmill at home 6 days/week for at least 40 minutes.  He has plans to purchase a newer treadmill, an elliptical, and weights for his home to enhance his exercise program.  Patient was encouraged to continue his exercise and praised for his work during the cardiac rehab program.      Aerobic exercise recommendations of 5 to 6 days/week for 30 to 60 minutes, resistance training 2 to 3 non-consecutive days/week and stretching after each session of exercise was reviewed and patient was asked to call if they have and questions in the future.  Patient stated understanding.    Patient was not compliant with attendance to the rehab classes.    Jose Martin Romero., CEP

## 2018-01-30 ENCOUNTER — OFFICE VISIT (OUTPATIENT)
Dept: CARDIOLOGY | Facility: CLINIC | Age: 63
End: 2018-01-30
Payer: COMMERCIAL

## 2018-01-30 VITALS
SYSTOLIC BLOOD PRESSURE: 102 MMHG | HEIGHT: 71 IN | DIASTOLIC BLOOD PRESSURE: 72 MMHG | OXYGEN SATURATION: 97 % | BODY MASS INDEX: 27.97 KG/M2 | HEART RATE: 61 BPM | WEIGHT: 199.75 LBS

## 2018-01-30 DIAGNOSIS — I25.118 CORONARY ARTERY DISEASE OF NATIVE ARTERY OF NATIVE HEART WITH STABLE ANGINA PECTORIS: Primary | ICD-10-CM

## 2018-01-30 DIAGNOSIS — R14.0 FLATULENCE/GAS PAIN/BELCHING: ICD-10-CM

## 2018-01-30 DIAGNOSIS — I21.21: ICD-10-CM

## 2018-01-30 DIAGNOSIS — G47.9 SLEEP DIFFICULTIES: ICD-10-CM

## 2018-01-30 PROCEDURE — 99999 PR PBB SHADOW E&M-EST. PATIENT-LVL III: CPT | Mod: PBBFAC,,, | Performed by: INTERNAL MEDICINE

## 2018-01-30 PROCEDURE — 99214 OFFICE O/P EST MOD 30 MIN: CPT | Mod: S$GLB,,, | Performed by: INTERNAL MEDICINE

## 2018-01-30 NOTE — LETTER
January 30, 2018      Ata Montana MD  [address]  [place] [address]           [facility]  [address]  [facility] [address]  Phone: [phone]          Patient: Gerard L Schoen III   MR Number: [id number]   YOB: 1955   Date of Visit: 1/30/2018       Dear No ref. provider found:    Thank you for referring Gerard Schoen to me for evaluation. Attached you will find relevant portions of my assessment and plan of care.    If you have questions, please do not hesitate to call me. I look forward to following Gerard Schoen along with you.    Sincerely,    Morteza Ramos MD    Enclosure  CC:  No Recipients    If you would like to receive this communication electronically, please contact [email or web address] or [phone] to request more information on [employer] access.    For providers and/or their staff who would like to refer a patient to [facility], please contact us through our one-stop-shop provider referral line, Tessa Hall, at [phone].    If you feel you have received this communication in error or would no longer like to receive these types of communications, please e-mail [email or web address]

## 2018-01-30 NOTE — ASSESSMENT & PLAN NOTE
CCS Class I. Exertion is not limited.  More than three months from MI. May discontinue metoprolol to see if it is contributing to sleep disturbance.. Although EF was reported to be 35%, echo this fall with EF 65%.  High intensity statin

## 2018-02-26 NOTE — PROGRESS NOTES
Message   Recorded as Task   Date: 12/28/2017 02:22 PM, Created By: Anthony Abrams   Task Name: Follow Up   Assigned To: [facility], [address] Procedure,Team   Regarding Patient: Allen Sam, Status: Active   Comment:    Racquel Díaz - 28 Dec 2017 2:22 PM     TASK CREATED  s/p RT TROCHANTERIC BURSA INJECTION on 12/21 by Dr Danielle Gilbert   no f/u scheduled  Racquel Díaz - 29 Dec 2017 2:22 PM     TASK EDITED  LM ON VM W/CB#   Nerissa Matthew - 03 Jan 2018 9:07 AM     TASK EDITED  [place] w/cb#   Emerita Mayen - 02 Jan 2018 9:24 AM     TASK EDITED  Pt returned call stating she had relief for the first 2 days, but then the pain came back  She is asking what she can do to relieve the pain  Pls call pt at [phone]  Renata Lentz - 11 Jan 2018 10:53 AM     TASK EDITED  S/W pt  Pt stated today is "very good day" for her whole body and hasn't had a good day since November  Pain varies- now pain is a 0/10 and on a bad day pain is 10/10 and she can't walk, can't stand and can't function  Pt stated she has Percoect and pt stated opioids are causing her irritable bowel  Pt stated on Sunday she went to the ER and the ER suggested Ultram so pt is going to her her PCP on 1/12/18  Pt stated tylenol, Aleve and Advil doesn't help  Pharmcy on file  Pt asking if [facility] can order medication  Pt stated the injection didn't work and she is still going to PT  She stated 2 days after the injection she had no pain and the the pain came back like it was before the injection  Pt wanted to make an SOVS   Scheduled pt for SOVS on 1/16/18 at 10:30 w/ AS  Please advise  Montse Day - 11 Jan 2018 12:11 PM     TASK REPLIED TO: Previously Assigned To Montse Day  Will discuss with her at her office visit  Renata Lentz - 11 Jan 2018 12:30 PM     TASK EDITED  S/W pt  Advised pt of the same  Pt verbalized understanding  Active Problems    1  Abnormal CT scan, liver (793 3) (R93 2)   2  Abnormal tumor markers (795 89) (R97 8)   3  Subjective:    Patient ID:  Gerard L Schoen III is a 62 y.o. male who presents for follow-up of Coronary Artery Disease       [place] mid [place] he and his wife relaxed on the bed and fell asleep. He had only complaints of heart burn before laying down. She awoke to find him in cardiac arrest. She performed CPR and called 911. He was shocked in the field and resuscitated. Brought to [facility] where he was found to have a CIrc infarct. Underwent primary PCI.Hosptialized for one week. EF reported to be 35% by LV gram.    Gerard L Schoen III returns for follow-up. Gerard L Schoen III is feeling well and is without complaints of chest pain, dyspnea, orthopnea and claudication.  He is doing well. Completed cardiac rehab. He continues to exercise in home gym with elliptical and treadmill. He could do better job with diet he confesses. Adherent with medications.  His complaints of bloating and flatulence have improved. Has seen GI. They are waiting for him to complete year of DAPT prior to scope.  Today he reports that he has disrupted sleep. Falls asleep. But then wakes and has trouble going back to sleep.      Coronary Artery Disease   Presents for follow-up visit. Pertinent negatives include no chest pain, dizziness, leg swelling, palpitations, shortness of breath or weight gain. The symptoms have been stable. Compliance with diet is variable. Compliance with exercise is good. Compliance with medications is good.       Review of Systems   Constitution: Negative for decreased appetite, fever, malaise/fatigue, weight gain and weight loss.   HENT: Negative for congestion, hoarse voice and sore throat.    Eyes: Negative for blurred vision, vision loss in left eye, vision loss in right eye, visual disturbance and visual halos.   Cardiovascular: Negative for chest pain, claudication, dyspnea on exertion, irregular heartbeat, leg swelling, near-syncope, orthopnea and  "palpitations.   Respiratory: Negative for cough, shortness of breath and snoring.    Hematologic/Lymphatic: Negative for bleeding problem. Does not bruise/bleed easily.   Skin: Negative for color change and itching.   Musculoskeletal: Negative for falls, muscle cramps and myalgias.   Gastrointestinal: Positive for bloating, abdominal pain and flatus. Negative for change in bowel habit, bowel incontinence, heartburn, nausea and vomiting.   Genitourinary: Negative for flank pain.   Neurological: Negative for excessive daytime sleepiness, dizziness, focal weakness, headaches, light-headedness, loss of balance, sensory change and vertigo.   Psychiatric/Behavioral: Negative for depression. The patient has insomnia.          /72 (BP Location: Right arm, Patient Position: Sitting, BP Method: Large (Automatic))   Pulse 61   Ht 5' 11" (1.803 m)   Wt 90.6 kg (199 lb 11.8 oz)   SpO2 97%   BMI 27.86 kg/m²     Objective:    Physical Exam   Constitutional: He is oriented to person, place, and time. He appears well-developed and well-nourished.   HENT:   Head: Normocephalic and atraumatic.   Eyes: Pupils are equal, round, and reactive to light.   Neck: Neck supple. No JVD present. Carotid bruit is not present.   Cardiovascular: Normal rate, regular rhythm, S1 normal, S2 normal and normal pulses.   No extrasystoles are present. PMI is not displaced.  Exam reveals no gallop and no friction rub.    No murmur heard.  Pulmonary/Chest: Effort normal and breath sounds normal. No respiratory distress. He has no wheezes. He has no rales.   Abdominal: Soft. Bowel sounds are normal.   Neurological: He is alert and oriented to person, place, and time. He has normal strength. No cranial nerve deficit.   Skin: Skin is warm and dry.   Psychiatric: He has a normal mood and affect. His speech is normal. Thought content normal.         Assessment:       1. Coronary artery disease of native artery of native heart with stable angina " Acquired hypothyroidism (244 9) (E03 9)   4  Acute hip pain, right (719 45) (M25 551)   5  Arthritis (716 90) (M19 90)   6  Asthma (493 90) (J45 909)   7  Balance problem (781 99) (R26 89)   8  Chest pain, unspecified type (786 50) (R07 9)   9  Chronic pain (338 29) (G89 29)   10  Chronic pain syndrome (338 4) (G89 4)   11  Concussion (850 9) (S06 0X9A)   12  Constipation due to opioid therapy (564 09,E935 2) (K59 03,T40 2X5A)   13  Daytime hypersomnolence (780 54) (G47 19)   14  Diabetes mellitus with neuropathy (250 60,357 2) (E11 40)   15  Diastolic dysfunction (910 6) (I51 9)   16  Dyspnea on exertion (786 09) (R06 09)   17  Edema (782 3) (R60 9)   18  Elevated LFTs (790 6) (R79 89)   19  Generalized colicky abdominal pain (789 7) (R10 83)   20  Generalized pain (780 96) (R52)   21  Greater trochanteric bursitis of right hip (726 5) (M70 61)   22  Hyperglycemia (790 29) (R73 9)   23  Hypertension (401 9) (I10)   24  Hypoventilation associated with obesity syndrome (278 03) (E66 2)   25  Iliotibial band syndrome, right leg (728 89) (M76 31)   26  Irritable bowel syndrome with diarrhea (564 1) (K58 0)   27  Left hip pain (719 45) (M25 552)   28  Liver lesion (573 8) (K76 9)   29  Lung nodules (793 19) (R91 8)   30  Meningiomas, multiple (237 6) (D42 9)   31  Mild persistent asthma with acute exacerbation (493 92) (J45 31)   32  Moderate episode of recurrent major depressive disorder (296 32) (F33 1)   33  Moderate obstructive sleep apnea (327 23) (G47 33)   34  Morbid obesity due to excess calories (278 01) (E66 01)   35  Pancreatic cyst (577 2) (K86 2)   36  Pulmonary emphysema (492 8) (J43 9)   37  Pulmonary nodules (793 19) (R91 8)   38  Restless leg syndrome (333 94) (G25 81)   39  Sleep apnea (780 57) (G47 30)   40  Thyroid disease (246 9) (E07 9)    Current Meds   1  ALPRAZolam ER 1 MG Oral Tablet Extended Release 24 Hour; TAKE 1 TABLET DAILY; Therapy: (Recorded:52Wdd6881) to Recorded   2   Aspirin 325 MG Oral Tablet; TAKE 1 TABLET DAILY; Therapy: (Recorded:52Umu8552) to Recorded   3  Cod Liver Oil/Vitamins A & D Oral Capsule; Take 1 teaspoon daily; Therapy: (Recorded:76Bxx2970) to Recorded   4  Dicyclomine HCl - 10 MG Oral Capsule; TAKE 1 CAPSULE 3 TIMES DAILY; Therapy: (Recorded:69Owj8725) to Recorded   5  Dulcolax TBEC; take 2 tablet twice daily; Therapy: (Sheri Jaquez) to Recorded   6  DuoNeb 0 5-2 5 (3) MG/3ML Inhalation Solution (Ipratropium-Albuterol); USE 1 UNIT   DOSE IN NEBULIZER EVERY 4 HOURS AS NEEDED; Therapy: (Recorded:51Gck1446) to Recorded   7  Escitalopram Oxalate 10 MG Oral Tablet; 1 every day; Therapy: (Recorded:55Nvz1017) to Recorded   8  Folic Acid 1 MG Oral Tablet; Take 1 tablet daily; Therapy: 44IFF5856 to (Last Rx:65Vke2598)  Requested for: 46Ler7163 Ordered   9  Lasix 40 MG Oral Tablet (Furosemide); Take: 2 tablets in the a m  and one tablet in the   afternoon; Therapy: (Recorded:55Shz8086) to Recorded   10  Levothyroxine Sodium 125 MCG Oral Tablet (Synthroid); TAKE ONE TABLET BY MOUTH    ONCE DAILY AS DIRECTED  Requested for: 25Oct2017; Last Rx:25Oct2017 Ordered   11  Lisinopril 10 MG Oral Tablet; TAKE 1 TABLET DAILY; Therapy: (Recorded:81Kza6609) to Recorded   12  LORazepam 1 MG Oral Tablet; Take ONE tablet 1 hour before MRI  May repeat once 15    minutes before MRI; Therapy: (Recorded:74Qua3822) to Recorded   13  MetOLazone 2 5 MG Oral Tablet; TAKE 1 TABLET DAILY ON MONDAY, WEDNESDAY,    AND FRIDAY; Therapy: 38YCN9731 to (Last Rx:29Uos1655)  Requested for: 78FIB7669 Ordered   14  Metoprolol Tartrate 25 MG Oral Tablet; TAKE 1 TABLET TWICE DAILY; Therapy: 37VNU1794 to (Evaluate:27Mar2018)  Requested for: 88Yme4451; Last    Rx:49Bki3997 Ordered   15  Multi-Vitamins Oral Tablet; TAKE 1 TABLET DAILY; Therapy: 36RAM2674 to Recorded   16   NovoLOG Mix 70/30 FlexPen (70-30) 100 UNIT/ML Subcutaneous Suspension    Pen-injector; INJECT SUBCUTANEOUSLY AS DIRECTED; pectoris    2. Myocardial infarction involving left circumflex coronary artery    3. Flatulence/gas pain/belching    4. Sleep difficulties         Plan:       Coronary artery disease of native artery of native heart with stable angina pectoris  CCS Class I. Exertion is not limited.  More than three months from MI. May discontinue metoprolol to see if it is contributing to sleep disturbance.. Although EF was reported to be 35%, echo this fall with EF 65%.  High intensity statin    Myocardial infarction involving left circumflex coronary artery  Complete one year DAPT.    Flatulence/gas pain/belching  Seen by GI.  It is slightly improved.    Sleep difficulties  Possibly secondary to beta blocker.    Follow-up in about 6 months (around 7/30/2018).    Orders Placed This Encounter   Procedures    CBC Without Differential    Comprehensive metabolic panel    Lipid panel                  Therapy: 91RWR9203 to (Last Rx:10Jan2018)  Requested for: 28DDL8621 Ordered   17  Oxycodone-Acetaminophen 5-325 MG Oral Tablet; TAKE 1 TABLET Every 6 hours FOR    PAIN RELIEF; Therapy: 02Apr2015 to (Evaluate:07Jan2018); Last Rx:41Hfe1600 Ordered   18  Sulindac 200 MG Oral Tablet; take 1 tablet every other day; Therapy: (Recorded:40Lmy3848) to Recorded   19  Voltaren 1 % Transdermal Gel (Diclofenac Sodium); Apply sparingly to affected are three    to four times a day; Therapy: (Recorded:59Dfx0484) to Recorded    Allergies    1  Latex Gloves MISC   2  Toradol Oral TABS    3  No Known Environmental Allergies   4   No Known Food Allergies    Signatures   Electronically signed by : Amanda Monte, ; Jan 11 2018 12:31PM EST                       (Author)

## 2018-04-06 ENCOUNTER — OFFICE VISIT (OUTPATIENT)
Dept: URGENT CARE | Facility: CLINIC | Age: 63
End: 2018-04-06
Payer: COMMERCIAL

## 2018-04-06 VITALS
WEIGHT: 195 LBS | RESPIRATION RATE: 18 BRPM | HEART RATE: 72 BPM | SYSTOLIC BLOOD PRESSURE: 120 MMHG | HEIGHT: 71 IN | OXYGEN SATURATION: 96 % | TEMPERATURE: 99 F | DIASTOLIC BLOOD PRESSURE: 78 MMHG | BODY MASS INDEX: 27.3 KG/M2

## 2018-04-06 DIAGNOSIS — J32.9 SINUSITIS, UNSPECIFIED CHRONICITY, UNSPECIFIED LOCATION: Primary | ICD-10-CM

## 2018-04-06 PROCEDURE — 99203 OFFICE O/P NEW LOW 30 MIN: CPT | Mod: 25,S$GLB,, | Performed by: FAMILY MEDICINE

## 2018-04-06 PROCEDURE — 96372 THER/PROPH/DIAG INJ SC/IM: CPT | Mod: S$GLB,,, | Performed by: FAMILY MEDICINE

## 2018-04-06 RX ORDER — PANTOPRAZOLE SODIUM 20 MG/1
20 TABLET, DELAYED RELEASE ORAL DAILY
COMMUNITY
End: 2018-07-17

## 2018-04-06 RX ORDER — AZITHROMYCIN 250 MG/1
TABLET, FILM COATED ORAL
Qty: 6 TABLET | Refills: 0 | Status: SHIPPED | OUTPATIENT
Start: 2018-04-06 | End: 2018-07-17

## 2018-04-06 RX ORDER — BETAMETHASONE SODIUM PHOSPHATE AND BETAMETHASONE ACETATE 3; 3 MG/ML; MG/ML
9 INJECTION, SUSPENSION INTRA-ARTICULAR; INTRALESIONAL; INTRAMUSCULAR; SOFT TISSUE
Status: COMPLETED | OUTPATIENT
Start: 2018-04-06 | End: 2018-04-06

## 2018-04-06 RX ORDER — FLUTICASONE PROPIONATE 50 MCG
2 SPRAY, SUSPENSION (ML) NASAL DAILY
Qty: 1 BOTTLE | Refills: 0 | Status: SHIPPED | OUTPATIENT
Start: 2018-04-06 | End: 2018-05-06

## 2018-04-06 RX ADMIN — BETAMETHASONE SODIUM PHOSPHATE AND BETAMETHASONE ACETATE 9 MG: 3; 3 INJECTION, SUSPENSION INTRA-ARTICULAR; INTRALESIONAL; INTRAMUSCULAR; SOFT TISSUE at 09:04

## 2018-04-06 NOTE — PATIENT INSTRUCTIONS
Sinusitis (Antibiotic Treatment)    The sinuses are air-filled spaces within the bones of the face. They connect to the inside of the nose. Sinusitis is an inflammation of the tissue lining the sinus cavity. Sinus inflammation can occur during a cold. It can also be due to allergies to pollens and other particles in the air. Sinusitis can cause symptoms of sinus congestion and fullness. A sinus infection causes fever, headache and facial pain. There is often green or yellow drainage from the nose or into the back of the throat (post-nasal drip). You have been given antibiotics to treat this condition.  Home care:  · Take the full course of antibiotics as instructed. Do not stop taking them, even if you feel better.  · Drink plenty of water, hot tea, and other liquids. This may help thin mucus. It also may promote sinus drainage.  · Heat may help soothe painful areas of the face. Use a towel soaked in hot water. Or,  the shower and direct the hot spray onto your face. Using a vaporizer along with a menthol rub at night may also help.   · An expectorant containing guaifenesin may help thin the mucus and promote drainage from the sinuses.  · Over-the-counter decongestants may be used unless a similar medicine was prescribed. Nasal sprays work the fastest. Use one that contains phenylephrine or oxymetazoline. First blow the nose gently. Then use the spray. Do not use these medicines more often than directed on the label or symptoms may get worse. You may also use tablets containing pseudoephedrine. Avoid products that combine ingredients, because side effects may be increased. Read labels. You can also ask the pharmacist for help. (NOTE: Persons with high blood pressure should not use decongestants. They can raise blood pressure.)  · Over-the-counter antihistamines may help if allergies contributed to your sinusitis.    · Do not use nasal rinses or irrigation during an acute sinus infection, unless told to by  your health care provider. Rinsing may spread the infection to other sinuses.  · Use acetaminophen or ibuprofen to control pain, unless another pain medicine was prescribed. (If you have chronic liver or kidney disease or ever had a stomach ulcer, talk with your doctor before using these medicines. Aspirin should never be used in anyone under 18 years of age who is ill with a fever. It may cause severe liver damage.)  · Don't smoke. This can worsen symptoms.  Follow-up care  Follow up with your healthcare provider or our staff if you are not improving within the next week.  When to seek medical advice  Call your healthcare provider if any of these occur:  · Facial pain or headache becoming more severe  · Stiff neck  · Unusual drowsiness or confusion  · Swelling of the forehead or eyelids  · Vision problems, including blurred or double vision  · Fever of 100.4ºF (38ºC) or higher, or as directed by your healthcare provider  · Seizure  · Breathing problems  · Symptoms not resolving within 10 days  Date Last Reviewed: 4/13/2015  © 8635-2099 [employer]. [address] [place] [address]. All rights reserved. This information is not intended as a substitute for professional medical care. Always follow your healthcare professional's instructions.    Follow up with your doctor in a few days as needed.  Return to the urgent care or go to the ER if symptoms get worse.    Isreal Garcia MD

## 2018-04-06 NOTE — PROGRESS NOTES
"Subjective:       Patient ID: Gerard L Schoen III is a 62 y.o. male.    Vitals:  height is 5' 11" (1.803 m) and weight is 88.5 kg (195 lb). His temperature is 98.9 °F (37.2 °C). His blood pressure is 120/78 and his pulse is 72. His respiration is 18 and oxygen saturation is 96%.     Chief Complaint: Sore Throat (Started 3 days ago with nasal drip )    Sore Throat    This is a new problem. The current episode started in the past 7 days. The problem has been unchanged. The pain is mild. Associated symptoms include a hoarse voice. Pertinent negatives include no abdominal pain, congestion, coughing, ear pain, headaches or shortness of breath. The treatment provided no relief.     Review of Systems   Constitution: Negative for chills, fever and malaise/fatigue.   HENT: Positive for hoarse voice and sore throat. Negative for congestion and ear pain.    Eyes: Negative for discharge and redness.   Cardiovascular: Negative for chest pain, dyspnea on exertion and leg swelling.   Respiratory: Negative for cough, shortness of breath, sputum production and wheezing.    Musculoskeletal: Negative for myalgias.   Gastrointestinal: Negative for abdominal pain and nausea.   Neurological: Negative for headaches.       Objective:      Physical Exam   Constitutional: He is oriented to person, place, and time. He appears well-developed and well-nourished.   HENT:   Head: Normocephalic and atraumatic.   Right Ear: External ear normal.   Left Ear: External ear normal.   Mouth/Throat: Oropharynx is clear and moist.   Bilateral frontal sinus tenderness.   Eyes: EOM are normal. Pupils are equal, round, and reactive to light.   Neck: Normal range of motion. Neck supple. No JVD present. No tracheal deviation present. No thyromegaly present.   Cardiovascular: Normal rate, regular rhythm and normal heart sounds.  Exam reveals no gallop and no friction rub.    No murmur heard.  Pulmonary/Chest: Breath sounds normal. No respiratory distress. He has " no wheezes. He has no rales. He exhibits no tenderness.   Abdominal: Soft. Bowel sounds are normal. He exhibits no distension and no mass. There is no tenderness. There is no rebound and no guarding. No hernia.   Musculoskeletal: Normal range of motion. He exhibits no edema, tenderness or deformity.   Lymphadenopathy:     He has no cervical adenopathy.   Neurological: He is alert and oriented to person, place, and time. He displays normal reflexes. No cranial nerve deficit. He exhibits normal muscle tone. Coordination normal.   Skin: Skin is warm. Capillary refill takes less than 2 seconds. No rash noted. No erythema. No pallor.   Psychiatric: He has a normal mood and affect. His behavior is normal. Judgment and thought content normal.   Vitals reviewed.      Assessment:       1. Sinusitis, unspecified chronicity, unspecified location        Plan:         Sinusitis, unspecified chronicity, unspecified location  -     betamethasone acetate-betamethasone sodium phosphate injection 9 mg; Inject 1.5 mLs (9 mg total) into the muscle one time.  -     azithromycin (Z-RADHA) 250 MG tablet; Take 2 tablets by mouth x 1 for day 1 Then take 1 tablet by mouth daily for day 2 - 5  Dispense: 6 tablet; Refill: 0  -     fluticasone (FLONASE) 50 mcg/actuation nasal spray; 2 sprays (100 mcg total) by Each Nare route once daily.  Dispense: 1 Bottle; Refill: 0      Follow up with your doctor in a few days as needed.  Return to the urgent care or go to the ER if symptoms get worse.    Isreal Garcia MD

## 2018-06-18 DIAGNOSIS — I25.118 CORONARY ARTERY DISEASE OF NATIVE ARTERY OF NATIVE HEART WITH STABLE ANGINA PECTORIS: Primary | ICD-10-CM

## 2018-06-18 RX ORDER — LISINOPRIL 20 MG/1
TABLET ORAL
Qty: 90 TABLET | Refills: 3 | Status: SHIPPED | OUTPATIENT
Start: 2018-06-18 | End: 2018-12-26

## 2018-06-18 RX ORDER — PANTOPRAZOLE SODIUM 40 MG/1
TABLET, DELAYED RELEASE ORAL
Qty: 180 TABLET | Refills: 3 | Status: SHIPPED | OUTPATIENT
Start: 2018-06-18 | End: 2019-02-27 | Stop reason: SDUPTHER

## 2018-07-12 ENCOUNTER — LAB VISIT (OUTPATIENT)
Dept: LAB | Facility: HOSPITAL | Age: 63
End: 2018-07-12
Attending: INTERNAL MEDICINE
Payer: COMMERCIAL

## 2018-07-12 DIAGNOSIS — I25.118 CORONARY ARTERY DISEASE OF NATIVE ARTERY OF NATIVE HEART WITH STABLE ANGINA PECTORIS: ICD-10-CM

## 2018-07-12 LAB
ALBUMIN SERPL BCP-MCNC: 4 G/DL
ALP SERPL-CCNC: 94 U/L
ALT SERPL W/O P-5'-P-CCNC: 36 U/L
ANION GAP SERPL CALC-SCNC: 9 MMOL/L
AST SERPL-CCNC: 37 U/L
BILIRUB SERPL-MCNC: 0.7 MG/DL
BUN SERPL-MCNC: 30 MG/DL
CALCIUM SERPL-MCNC: 9.4 MG/DL
CHLORIDE SERPL-SCNC: 109 MMOL/L
CHOLEST SERPL-MCNC: 146 MG/DL
CHOLEST/HDLC SERPL: 2.3 {RATIO}
CO2 SERPL-SCNC: 22 MMOL/L
CREAT SERPL-MCNC: 1.3 MG/DL
ERYTHROCYTE [DISTWIDTH] IN BLOOD BY AUTOMATED COUNT: 14.3 %
EST. GFR  (AFRICAN AMERICAN): >60 ML/MIN/1.73 M^2
EST. GFR  (NON AFRICAN AMERICAN): 58.9 ML/MIN/1.73 M^2
GLUCOSE SERPL-MCNC: 102 MG/DL
HCT VFR BLD AUTO: 41.8 %
HDLC SERPL-MCNC: 63 MG/DL
HDLC SERPL: 43.2 %
HGB BLD-MCNC: 13.8 G/DL
LDLC SERPL CALC-MCNC: 66.4 MG/DL
MCH RBC QN AUTO: 31.6 PG
MCHC RBC AUTO-ENTMCNC: 33 G/DL
MCV RBC AUTO: 96 FL
NONHDLC SERPL-MCNC: 83 MG/DL
PLATELET # BLD AUTO: 205 K/UL
PMV BLD AUTO: 9.9 FL
POTASSIUM SERPL-SCNC: 4.1 MMOL/L
PROT SERPL-MCNC: 7 G/DL
RBC # BLD AUTO: 4.37 M/UL
SODIUM SERPL-SCNC: 140 MMOL/L
TRIGL SERPL-MCNC: 83 MG/DL
WBC # BLD AUTO: 4.69 K/UL

## 2018-07-12 PROCEDURE — 85027 COMPLETE CBC AUTOMATED: CPT

## 2018-07-12 PROCEDURE — 80061 LIPID PANEL: CPT

## 2018-07-12 PROCEDURE — 80053 COMPREHEN METABOLIC PANEL: CPT

## 2018-07-12 PROCEDURE — 36415 COLL VENOUS BLD VENIPUNCTURE: CPT | Mod: PO

## 2018-07-17 ENCOUNTER — OFFICE VISIT (OUTPATIENT)
Dept: CARDIOLOGY | Facility: CLINIC | Age: 63
End: 2018-07-17
Payer: COMMERCIAL

## 2018-07-17 VITALS
OXYGEN SATURATION: 97 % | WEIGHT: 198.88 LBS | BODY MASS INDEX: 27.84 KG/M2 | SYSTOLIC BLOOD PRESSURE: 102 MMHG | DIASTOLIC BLOOD PRESSURE: 78 MMHG | HEIGHT: 71 IN | HEART RATE: 75 BPM

## 2018-07-17 DIAGNOSIS — R14.0 FLATULENCE/GAS PAIN/BELCHING: ICD-10-CM

## 2018-07-17 DIAGNOSIS — I25.118 CORONARY ARTERY DISEASE OF NATIVE ARTERY OF NATIVE HEART WITH STABLE ANGINA PECTORIS: Primary | ICD-10-CM

## 2018-07-17 DIAGNOSIS — I21.21: ICD-10-CM

## 2018-07-17 PROCEDURE — 99214 OFFICE O/P EST MOD 30 MIN: CPT | Mod: S$GLB,,, | Performed by: INTERNAL MEDICINE

## 2018-07-17 PROCEDURE — 99999 PR PBB SHADOW E&M-EST. PATIENT-LVL III: CPT | Mod: PBBFAC,,, | Performed by: INTERNAL MEDICINE

## 2018-07-17 NOTE — LETTER
July 17, 2018      Ata Montana MD  [address] [facility]  [place] [address]           [facility]  [address]  [facility] [address]  Phone: [phone]          Patient: Gerard L Schoen III   MR Number: [id number]   YOB: 1955   Date of Visit: 7/17/2018       Dear No ref. provider found:    Thank you for referring Gerard Schoen to me for evaluation. Attached you will find relevant portions of my assessment and plan of care.    If you have questions, please do not hesitate to call me. I look forward to following Gerard Schoen along with you.    Sincerely,    Morteza Ramos MD    Enclosure  CC:  No Recipients    If you would like to receive this communication electronically, please contact [email or web address] or [phone] to request more information on [facility] access.    For providers and/or their staff who would like to refer a patient to [facility], please contact us through our one-stop-shop provider referral line, Tessa Hall, at [phone].    If you feel you have received this communication in error or would no longer like to receive these types of communications, please e-mail [email or web address]

## 2018-07-17 NOTE — ASSESSMENT & PLAN NOTE
CCS Class I. Exertion is not limited.  One year post MI.  Although EF was reported to be 35%, echo this fall with EF 65%.  High intensity statin  May discontinue ticagrelor, has completed one year of DAPT. He is not at increased risk for ischemic complications.

## 2018-07-17 NOTE — ASSESSMENT & PLAN NOTE
Has completed one year of DAPT. Continue ASA 81 mg daily.  May proceed with endoscopy if indicated.

## 2018-07-17 NOTE — PROGRESS NOTES
Subjective:   Referring provider: No ref. provider found  Patient ID: Gerard L Schoen III is 62 y.o. male who presents for Follow-Up of Coronary Artery Disease        Gerard L Schoen III returns for follow-up. Gerard L Schoen III is feeling well and is without complaints of chest pain, dyspnea, orthopnea and claudication.   In mid [place] 2017 [place] he and his wife relaxed on the bed and fell asleep. He had only complaints of heart burn before laying down. She awoke to find him in cardiac arrest. She performed CPR and called 911. He was shocked in the field and resuscitated. Brought to [facility] where he was found to have a CIrc infarct. Underwent primary PCI. Hospitlized for one week. EF reported to be 35% by LV gram.    Gerard L Schoen III returns for follow-up. Gerard L Schoen III is feeling well and is without complaints of chest pain, dyspnea, orthopnea and claudication.  He is doing well. Completed cardiac rehab. He continues to exercise in home gym with elliptical and treadmill. He could do better job with diet he confesses. Adherent with medications.  His complaints of bloating and flatulence have improved. Has seen GI. Has completed year of DAPT.        Coronary Artery Disease   Presents for follow-up visit. Pertinent negatives include no chest pain, dizziness, leg swelling, palpitations, shortness of breath or weight gain. The symptoms have been stable. Compliance with diet is good. Compliance with exercise is good. Compliance with medications is good.     Review of Systems   Constitution: Negative for decreased appetite, fever, malaise/fatigue, weight gain and weight loss.   HENT: Negative for congestion, hoarse voice and sore throat.    Eyes: Negative for blurred vision, vision loss in left eye, vision loss in right eye, visual disturbance and visual halos.   Cardiovascular: Negative for chest pain, claudication, dyspnea on exertion, irregular  heartbeat, leg swelling, near-syncope, orthopnea and palpitations.   Respiratory: Negative for cough, shortness of breath and snoring.    Hematologic/Lymphatic: Negative for bleeding problem. Does not bruise/bleed easily.   Skin: Negative for color change and itching.   Musculoskeletal: Negative for falls, muscle cramps and myalgias.   Gastrointestinal: Positive for bloating and heartburn. Negative for abdominal pain, change in bowel habit, bowel incontinence, constipation, nausea and vomiting.   Genitourinary: Negative for flank pain.   Neurological: Negative for excessive daytime sleepiness, dizziness, focal weakness, headaches, light-headedness, loss of balance, sensory change and vertigo.   Psychiatric/Behavioral: Negative for depression. The patient does not have insomnia.      Family History   Problem Relation Age of Onset    Cancer Father     Cancer Mother         lymphoma    Cancer Sister         lymphoma    Coronary artery disease Paternal Grandfather     Cancer Maternal Grandfather         lung    Colon cancer Neg Hx     Cirrhosis Neg Hx     Celiac disease Neg Hx     Crohn's disease Neg Hx     Ulcerative colitis Neg Hx     Esophageal cancer Neg Hx     Stomach cancer Neg Hx     Rectal cancer Neg Hx     Liver cancer Neg Hx     Irritable bowel syndrome Neg Hx      Past Surgical History:   Procedure Laterality Date    COLONOSCOPY  2008    diverticulosis     Family History   Problem Relation Age of Onset    Cancer Father     Cancer Mother         lymphoma    Cancer Sister         lymphoma    Coronary artery disease Paternal Grandfather     Cancer Maternal Grandfather         lung    Colon cancer Neg Hx     Cirrhosis Neg Hx     Celiac disease Neg Hx     Crohn's disease Neg Hx     Ulcerative colitis Neg Hx     Esophageal cancer Neg Hx     Stomach cancer Neg Hx     Rectal cancer Neg Hx     Liver cancer Neg Hx     Irritable bowel syndrome Neg Hx      /78 (BP Location: Right arm,  "Patient Position: Sitting, BP Method: Large (Automatic))   Pulse 75   Ht 5' 11" (1.803 m)   Wt 90.2 kg (198 lb 13.7 oz)   SpO2 97%   BMI 27.73 kg/m²     Objective:   Physical Exam   Constitutional: He is oriented to person, place, and time. He appears well-developed and well-nourished.   HENT:   Head: Normocephalic and atraumatic.   Eyes: Pupils are equal, round, and reactive to light.   Neck: Neck supple. No JVD present. Carotid bruit is not present.   Cardiovascular: Normal rate, regular rhythm, S1 normal, S2 normal and normal pulses.   No extrasystoles are present. PMI is not displaced.  Exam reveals no gallop and no friction rub.    No murmur heard.  Pulmonary/Chest: Effort normal and breath sounds normal. No respiratory distress. He has no wheezes. He has no rales.   Abdominal: Soft. Bowel sounds are normal.   Neurological: He is alert and oriented to person, place, and time. He has normal strength. No cranial nerve deficit.   Skin: Skin is warm and dry.   Psychiatric: He has a normal mood and affect. His speech is normal. Thought content normal.     Assessment:     1. Coronary artery disease of native artery of native heart with stable angina pectoris    2. Myocardial infarction involving left circumflex coronary artery, unspecified MI type      Plan:     Coronary artery disease of native artery of native heart with stable angina pectoris  CCS Class I. Exertion is not limited.  One year post MI.  Although EF was reported to be 35%, echo this fall with EF 65%.  High intensity statin  May discontinue ticagrelor, has completed one year of DAPT. He is not at increased risk for ischemic complications.    Myocardial infarction involving left circumflex coronary artery  Continue ACEI    Flatulence/gas pain/belching  Has completed one year of DAPT. Continue ASA 81 mg daily.  May proceed with endoscopy if indicated.      Follow-up in about 1 year (around 7/17/2019).  "

## 2018-12-26 ENCOUNTER — OFFICE VISIT (OUTPATIENT)
Dept: CARDIOLOGY | Facility: CLINIC | Age: 63
End: 2018-12-26
Payer: COMMERCIAL

## 2018-12-26 VITALS
WEIGHT: 197.56 LBS | DIASTOLIC BLOOD PRESSURE: 64 MMHG | BODY MASS INDEX: 27.55 KG/M2 | HEART RATE: 76 BPM | SYSTOLIC BLOOD PRESSURE: 124 MMHG

## 2018-12-26 DIAGNOSIS — I25.118 CORONARY ARTERY DISEASE OF NATIVE ARTERY OF NATIVE HEART WITH STABLE ANGINA PECTORIS: Primary | ICD-10-CM

## 2018-12-26 PROBLEM — I21.21: Status: RESOLVED | Noted: 2017-08-01 | Resolved: 2018-12-26

## 2018-12-26 PROCEDURE — 99213 OFFICE O/P EST LOW 20 MIN: CPT | Mod: S$GLB,,, | Performed by: INTERNAL MEDICINE

## 2018-12-26 PROCEDURE — 99999 PR PBB SHADOW E&M-EST. PATIENT-LVL III: CPT | Mod: PBBFAC,,, | Performed by: INTERNAL MEDICINE

## 2018-12-26 RX ORDER — DOCUSATE SODIUM 100 MG/1
100 CAPSULE, LIQUID FILLED ORAL DAILY
COMMUNITY
End: 2022-04-04

## 2018-12-26 RX ORDER — IRBESARTAN 150 MG/1
150 TABLET ORAL NIGHTLY
Qty: 90 TABLET | Refills: 3 | Status: SHIPPED | OUTPATIENT
Start: 2018-12-26 | End: 2019-01-11 | Stop reason: CLARIF

## 2018-12-26 NOTE — PROGRESS NOTES
Cardiology Clinic Note  Reason for Visit: CAD    HPI:     Gerard L Schoen III is a 63 y.o. M with CAD s/p STEMI with PCI to LCx (2017), who presents for follow up. He was last seen by Dr Ramos on 18.    He reports doing well since his last visit. He exercises 6 days per week the elliptical for 47 minutes or the treadmill for 45 minutes. No ischemic symptoms during exercise. No heart failure symptoms. He does eat fast food frequently ([employer]) for dinner. He has not smoked since his heart attack.     He was on aspirin and plavix for one year following the stent. Plavix was discontinued at last visit with Dr Ramos in 2017.    Medical: CAD s/p STEMI with PCI to LCx (2017; completed cardiac rehab), GERD  Surgical: none  Family: sister  CHF at 48yo, both parents decreased from cancer, grandparents with MIs  Social: smoked >30y (<1 PPD, quit 2017), occasional EtOH, his wife is an RN at [employer] in the day of surgery center    [facility] 7/15/17  LAD - large vessel, wraps around apex, diffuse intimal disease, distal 60% stenosis  D1 - moderate to large size, prox 50% stenosis  LCx - totally occluded in mid vessel (s/p Resolute Integrity 2.75mm x 18mm stent)  RCA - large co-dominant vessel, prox 40% stenosis, mid 40% stenosis  LVEF 45%    ROS:    Constitution: Negative for fever or chills.  HENT: Negative for  headaches.  Eyes: Negative for blurred vision.   Cardiovascular: See above  Pulmonary: Negative for SOB. Negative for cough.   Gastrointestinal: Negative for nausea/vomiting.   : Negative for dysuria.   Skin: Negative for rashes.  Neurological: Negative for focal weakness.  Psychological: Negative for depression.    Medications:     Current Outpatient Medications on File Prior to Visit   Medication Sig Dispense Refill    aspirin (ECOTRIN) 81 MG EC tablet Take 81 mg by mouth once daily.  0    atorvastatin (LIPITOR) 40 MG tablet Take 1 tablet (40 mg total) by mouth once daily. 90 tablet 3     lisinopril (PRINIVIL,ZESTRIL) 20 MG tablet TAKE 1 TABLET DAILY 90 tablet 3    pantoprazole (PROTONIX) 40 MG tablet TAKE 1 TABLET TWICE A  tablet 3     No current facility-administered medications on file prior to visit.        Physical Exam:   /64   Pulse 76   Wt 89.6 kg (197 lb 8.5 oz)   BMI 27.55 kg/m²      Constitutional: No apparent distress, conversant  HEENT: Sclera anicteric, extraocular movements intact  Neck: No jugular venous distension, no carotid bruits  CV: Regular rate and rhythm, no murmurs rubs or gallops, normal S1/S2  Pulm: Clear to auscultation bilaterally  GI: Abdomen soft, no palpable masses  Extremities: No lower extremity edema, warm with palpable pulses  Skin: No ecchymosis, erythema, or ulcers  Psych: Alert and oriented to person place location, appropriate affect  Neuro: No focal deficits    Labs:     Blood Tests:  Lab Results   Component Value Date     07/12/2018    K 4.1 07/12/2018     07/12/2018    CO2 22 (L) 07/12/2018    BUN 30 (H) 07/12/2018    CREATININE 1.3 07/12/2018     07/12/2018    AST 37 07/12/2018    ALT 36 07/12/2018    ALBUMIN 4.0 07/12/2018    PROT 7.0 07/12/2018    BILITOT 0.7 07/12/2018    WBC 4.69 07/12/2018    HGB 13.8 (L) 07/12/2018    HCT 41.8 07/12/2018    MCV 96 07/12/2018     07/12/2018    PSA 1.3 11/08/2016    TSH 2.009 11/08/2016       Lab Results   Component Value Date    CHOL 146 07/12/2018    HDL 63 07/12/2018    TRIG 83 07/12/2018       Lab Results   Component Value Date    LDLCALC 66.4 07/12/2018       Urine Tests:  Lab Results   Component Value Date    COLORU YELLOW 03/25/2004    APPEARANCEUA Hazy (A) 03/25/2004    PHUR 5.5 03/25/2004    SPECGRAV 1.018 03/25/2004    PROTEINUA NEG 03/25/2004    GLUCUA NEG 03/25/2004    KETONESU NEG 03/25/2004    BILIRUBINUA NEG 03/25/2004    OCCULTUA TR (A) 03/25/2004    NITRITE POS (A) 03/25/2004    UROBILINOGEN 0.2 03/25/2004    LEUKOCYTESUR TR (A) 03/25/2004       Imaging:      Echocardiogram  None    Stress testing  ALIVIA 9/27/17  PRE-TEST DATA   EKG: Resting electrocardiogram reveals normal sinus rhythm at a rate of 65 bpm.     TEST DESCRIPTION   The patient exercised for 8.08 minutes on a High Ramp protocol, corresponding to a functional capacity of 13 estimated METS, achieving a peak heart rate of 116 bpm, which is 73% of the age predicted maximum heart rate. The patient discontinued exercise   secondary to fatigue.     There were no significant electrocardiographic changes throughout the protocol suggesting ischemia.     EKG Conclusions:  1. The EKG portion of this study is negative for ischemia at a high workload, and peak heart rate of 116 bpm (73% of predicted).   2. Exercise capacity is average.   3. Blood pressure response to exercise was normal (Presenting BP: 106/71 Peak BP: 156/85).   4. The following arrhythmias were present: rare PVCs.   5. There were no symptoms of chest discomfort or significant dyspnea throughout the protocol.   6. The Duke treadmill score was 8 suggesting a low probability for future cardiovascular events.    CONCLUSIONS     1 - Normal left ventricular systolic function (EF 60-65%).     2 - No wall motion abnormalities.     3 - Normal left ventricular diastolic function.     4 - Normal right ventricular systolic function .     No evidence of stress induced myocardial ischemia.     Impression: NORMAL MYOCARDIAL PERFUSION  1. The perfusion scan is free of evidence for myocardial ischemia or injury.   2. Resting wall motion is physiologic.   3. Resting LV function is normal.  (normal is >= 51%)  4. The ventricular volumes are normal at rest and stress.   5. The extracardiac distribution of radioactivity is normal.     SPECT 12/2/16  Impression: NORMAL MYOCARDIAL PERFUSION  1. The perfusion scan is free of evidence for myocardial ischemia or injury.   2. Resting wall motion is physiologic.   3. Resting LV function is normal.  (normal is >= 51%)  4. The  ventricular volumes are normal at rest and stress.   5. The extracardiac distribution of radioactivity is normal.     Cath Lab  [facility] 7/15/17  LAD - large vessel, wraps around apex, diffuse intimal disease, distal 60% stenosis  D1 - moderate to large size, prox 50% stenosis  LCx - totally occluded in mid vessel (s/p Resolute Integrity 2.75mm x 18mm stent)  RCA - large co-dominant vessel, prox 40% stenosis, mid 40% stenosis  LVEF 45%    Other  CPX 17  CPX Conclusions:  Normal functional status associated with a reduced breathing reserve, normal oxygen saturation, an excellent effort, and a normal AT. No clinically significant functional impairment. Possible sub-clinical ventilatory defect (see text above.)    EK17 - sinus rhythm with 1st degree AVB, LAD    Assessment:     1. Coronary artery disease of native artery of native heart with stable angina pectoris    2.      Hypertension    Plan:     Coronary artery disease of native artery of native heart with stable angina pectoris  S/p PCI to LCx (2017)  LVEF decreased to 45% initially but had subsequent recover with LVEF 60-65% on echo 2017.  Continue aspirin  81mg daily for life  Lipid profile 18 with , TG 83, HDL 63, LDL 66 (at goal). Continue atorvastatin 40mg daily.  Encouraged continued regular aerobic exercise of 30-45 minutes/day, 5-7x/week.  Discussed Mediterranean diet.     Hypertension  Discontinue lisinopril due to increased risk for lung cancer  Start on irbesartan 150mg daily  Instructed to check BP at home daily for 1 week following starting the medication. Will send in BP log.    Signed:  Reza Goodman MD  Cardiology     2018 11:43 AM    Follow-up:     Future Appointments   Date Time Provider Department Center   2018 10:00 AM Salvador Goodman III, MD [facility] CARDIO [place]

## 2018-12-26 NOTE — LETTER
December 26, 2018      Morteza Ramos MD  [address]  [place] [address]           [place] - Cardiology  [address] [place] [address]  Phone: [phone]          Patient: Gerard L Schoen III   MR Number: [id number]   YOB: 1955   Date of Visit: 12/26/2018       Dear Dr. Morteza Ramos:    Thank you for referring Gerard Schoen to me for evaluation. Attached you will find relevant portions of my assessment and plan of care.    If you have questions, please do not hesitate to call me. I look forward to following Gerard Schoen along with you.    Sincerely,    Salvadro Goodman III, MD    Enclosure  CC:  No Recipients    If you would like to receive this communication electronically, please contact [email or web address] or [phone] to request more information on [employer] access.    For providers and/or their staff who would like to refer a patient to [facility], please contact us through our one-stop-shop provider referral line, [facility], at [phone].    If you feel you have received this communication in error or would no longer like to receive these types of communications, please e-mail [email or web address]

## 2019-01-11 ENCOUNTER — TELEPHONE (OUTPATIENT)
Dept: INTERNAL MEDICINE | Facility: CLINIC | Age: 64
End: 2019-01-11

## 2019-01-11 ENCOUNTER — HOSPITAL ENCOUNTER (OUTPATIENT)
Dept: RADIOLOGY | Facility: HOSPITAL | Age: 64
Discharge: HOME | End: 2019-01-11
Attending: INTERNAL MEDICINE
Payer: COMMERCIAL

## 2019-01-11 ENCOUNTER — OFFICE VISIT (OUTPATIENT)
Dept: INTERNAL MEDICINE | Facility: CLINIC | Age: 64
End: 2019-01-11
Payer: COMMERCIAL

## 2019-01-11 VITALS
SYSTOLIC BLOOD PRESSURE: 90 MMHG | RESPIRATION RATE: 18 BRPM | HEART RATE: 72 BPM | WEIGHT: 201.25 LBS | HEIGHT: 72 IN | TEMPERATURE: 98 F | BODY MASS INDEX: 27.26 KG/M2 | DIASTOLIC BLOOD PRESSURE: 61 MMHG

## 2019-01-11 DIAGNOSIS — H00.015 HORDEOLUM EXTERNUM OF LEFT LOWER EYELID: ICD-10-CM

## 2019-01-11 DIAGNOSIS — M25.512 CHRONIC PAIN OF BOTH SHOULDERS: ICD-10-CM

## 2019-01-11 DIAGNOSIS — M25.511 CHRONIC PAIN OF BOTH SHOULDERS: ICD-10-CM

## 2019-01-11 DIAGNOSIS — I10 ESSENTIAL HYPERTENSION: Primary | ICD-10-CM

## 2019-01-11 DIAGNOSIS — G89.29 CHRONIC PAIN OF BOTH SHOULDERS: ICD-10-CM

## 2019-01-11 DIAGNOSIS — Z00.00 ANNUAL PHYSICAL EXAM: ICD-10-CM

## 2019-01-11 DIAGNOSIS — R07.81 RIB PAIN ON RIGHT SIDE: ICD-10-CM

## 2019-01-11 DIAGNOSIS — R07.81 RIB PAIN ON RIGHT SIDE: Primary | ICD-10-CM

## 2019-01-11 DIAGNOSIS — M25.519 SHOULDER PAIN, UNSPECIFIED CHRONICITY, UNSPECIFIED LATERALITY: ICD-10-CM

## 2019-01-11 PROCEDURE — 72050 XR CERVICAL SPINE AP LAT WITH FLEX EXTEN: ICD-10-PCS | Mod: 26,,, | Performed by: RADIOLOGY

## 2019-01-11 PROCEDURE — 99999 PR PBB SHADOW E&M-EST. PATIENT-LVL IV: ICD-10-PCS | Mod: PBBFAC,,, | Performed by: INTERNAL MEDICINE

## 2019-01-11 PROCEDURE — 3074F SYST BP LT 130 MM HG: CPT | Mod: CPTII,S$GLB,, | Performed by: INTERNAL MEDICINE

## 2019-01-11 PROCEDURE — 72070 XR THORACIC SPINE AP LATERAL: ICD-10-PCS | Mod: 26,,, | Performed by: RADIOLOGY

## 2019-01-11 PROCEDURE — 72070 X-RAY EXAM THORAC SPINE 2VWS: CPT | Mod: TC,PO

## 2019-01-11 PROCEDURE — 71100 X-RAY EXAM RIBS UNI 2 VIEWS: CPT | Mod: 26,RT,, | Performed by: RADIOLOGY

## 2019-01-11 PROCEDURE — 71100 X-RAY EXAM RIBS UNI 2 VIEWS: CPT | Mod: TC,PO,RT

## 2019-01-11 PROCEDURE — 99214 OFFICE O/P EST MOD 30 MIN: CPT | Mod: S$GLB,,, | Performed by: INTERNAL MEDICINE

## 2019-01-11 PROCEDURE — 3078F PR MOST RECENT DIASTOLIC BLOOD PRESSURE < 80 MM HG: ICD-10-PCS | Mod: CPTII,S$GLB,, | Performed by: INTERNAL MEDICINE

## 2019-01-11 PROCEDURE — 72070 X-RAY EXAM THORAC SPINE 2VWS: CPT | Mod: 26,,, | Performed by: RADIOLOGY

## 2019-01-11 PROCEDURE — 71100 XR RIBS 2 VIEW RIGHT: ICD-10-PCS | Mod: 26,RT,, | Performed by: RADIOLOGY

## 2019-01-11 PROCEDURE — 73030 X-RAY EXAM OF SHOULDER: CPT | Mod: 26,50,, | Performed by: RADIOLOGY

## 2019-01-11 PROCEDURE — 99999 PR PBB SHADOW E&M-EST. PATIENT-LVL IV: CPT | Mod: PBBFAC,,, | Performed by: INTERNAL MEDICINE

## 2019-01-11 PROCEDURE — 3008F BODY MASS INDEX DOCD: CPT | Mod: CPTII,S$GLB,, | Performed by: INTERNAL MEDICINE

## 2019-01-11 PROCEDURE — 73030 X-RAY EXAM OF SHOULDER: CPT | Mod: TC,50,PO

## 2019-01-11 PROCEDURE — 72050 X-RAY EXAM NECK SPINE 4/5VWS: CPT | Mod: 26,,, | Performed by: RADIOLOGY

## 2019-01-11 PROCEDURE — 3078F DIAST BP <80 MM HG: CPT | Mod: CPTII,S$GLB,, | Performed by: INTERNAL MEDICINE

## 2019-01-11 PROCEDURE — 99214 PR OFFICE/OUTPT VISIT, EST, LEVL IV, 30-39 MIN: ICD-10-PCS | Mod: S$GLB,,, | Performed by: INTERNAL MEDICINE

## 2019-01-11 PROCEDURE — 3074F PR MOST RECENT SYSTOLIC BLOOD PRESSURE < 130 MM HG: ICD-10-PCS | Mod: CPTII,S$GLB,, | Performed by: INTERNAL MEDICINE

## 2019-01-11 PROCEDURE — 72050 X-RAY EXAM NECK SPINE 4/5VWS: CPT | Mod: TC,PO

## 2019-01-11 PROCEDURE — 73030 XR SHOULDER COMPLETE 2 OR MORE VIEWS BILATERAL: ICD-10-PCS | Mod: 26,50,, | Performed by: RADIOLOGY

## 2019-01-11 PROCEDURE — 3008F PR BODY MASS INDEX (BMI) DOCUMENTED: ICD-10-PCS | Mod: CPTII,S$GLB,, | Performed by: INTERNAL MEDICINE

## 2019-01-11 RX ORDER — IRBESARTAN 150 MG/1
150 TABLET ORAL NIGHTLY
COMMUNITY
End: 2019-02-27 | Stop reason: SDUPTHER

## 2019-01-11 RX ORDER — LISINOPRIL 20 MG/1
20 TABLET ORAL DAILY
COMMUNITY
End: 2019-01-11

## 2019-01-11 RX ORDER — GABAPENTIN 300 MG/1
300 CAPSULE ORAL NIGHTLY
Qty: 30 CAPSULE | Refills: 11 | Status: SHIPPED | OUTPATIENT
Start: 2019-01-11 | End: 2019-07-02

## 2019-01-11 RX ORDER — NAPROXEN 500 MG/1
500 TABLET ORAL 2 TIMES DAILY
Qty: 60 TABLET | Refills: 2 | Status: SHIPPED | OUTPATIENT
Start: 2019-01-11 | End: 2019-07-02

## 2019-01-11 NOTE — PROGRESS NOTES
Subjective:       Patient ID: Gerard L Schoen III is a 63 y.o. male.    Chief Complaint: Muscle Pain (both shoulders and sides, worse at night, can't sleep)    HPI     63-year-old male here for evaluation of upper body pain.    HTN - Patient is currently on lisinopril 20 mg, irbesartan 150 mg. He does not check his BP at home. Side effects of medications note: none. Denies headaches, blurred vision, chest pain, shortness of breath, nausea.    He complains of shoulder pain. He has been seeing [facility] for his pain for two years.  He has had constant pain in the right side ribs when he goes to lie down.  He has scapular pain.  There is no pain during the day.  He takes tylenol to go to sleep and wakes up at night.  At the [facility], he had rehab type work done.  These are chiropractors.  He had been doing elliptical machines and stopped this the last month, but still has pain in his side.  His bed is brand new - got a new bed that he can adjust.  He feels better if the pressure is not on his shoulders.    Review of Systems   Constitutional: Negative for fever.   Cardiovascular: Negative for chest pain.   Gastrointestinal: Negative for abdominal pain.   Genitourinary: Negative for dysuria and hematuria.   Musculoskeletal: Positive for back pain.   Neurological: Negative for weakness, numbness and headaches.       Objective:      Physical Exam   Constitutional: He is oriented to person, place, and time. He appears well-developed and well-nourished.   HENT:   Head: Normocephalic and atraumatic.   Mouth/Throat: No oropharyngeal exudate.   Eyes: EOM are normal. Pupils are equal, round, and reactive to light. Right eye exhibits no discharge. Left eye exhibits no discharge. No scleral icterus.   Neck: Normal range of motion. Neck supple. No tracheal deviation present. No thyromegaly present.   Cardiovascular: Normal rate, regular rhythm and normal heart sounds. Exam reveals no gallop and no  friction rub.   No murmur heard.  Pulmonary/Chest: Effort normal and breath sounds normal. No respiratory distress. He has no wheezes. He has no rales. He exhibits no tenderness.   Abdominal: Soft. Bowel sounds are normal. He exhibits no distension and no mass. There is no tenderness. There is no rebound and no guarding.   Musculoskeletal: Normal range of motion. He exhibits no edema or tenderness.   Neurological: He is alert and oriented to person, place, and time.   Skin: Skin is warm and dry. No rash noted. No erythema. No pallor.   Psychiatric: He has a normal mood and affect. His behavior is normal.   Vitals reviewed.      Assessment:       1. Essential hypertension    2. Chronic pain of both shoulders    3. Rib pain on right side    4. Hordeolum externum of left lower eyelid        Plan:       1.  Patient advised to discontinue lisinopril completely.  Should only be on irbesartan 150 mg daily.  2/3.  Check x-ray cervical spine, thoracic spine, shoulders bilaterally, chest x-ray.  Plan to decide on back and spine 1st pain management once I see the x-rays.  Gabapentin 300 mg nightly prescribed.  4.  Hot compresses.

## 2019-01-11 NOTE — TELEPHONE ENCOUNTER
No significant arthritic changes noted.  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  Released to patient portal.

## 2019-02-20 ENCOUNTER — LAB VISIT (OUTPATIENT)
Dept: LAB | Facility: HOSPITAL | Age: 64
End: 2019-02-20
Attending: INTERNAL MEDICINE
Payer: COMMERCIAL

## 2019-02-20 DIAGNOSIS — I10 ESSENTIAL HYPERTENSION: ICD-10-CM

## 2019-02-20 DIAGNOSIS — Z00.00 ANNUAL PHYSICAL EXAM: ICD-10-CM

## 2019-02-20 LAB
ALBUMIN SERPL BCP-MCNC: 3.8 G/DL
ALP SERPL-CCNC: 89 U/L
ALT SERPL W/O P-5'-P-CCNC: 29 U/L
ANION GAP SERPL CALC-SCNC: 9 MMOL/L
AST SERPL-CCNC: 26 U/L
BASOPHILS # BLD AUTO: 0.03 K/UL
BASOPHILS NFR BLD: 0.6 %
BILIRUB SERPL-MCNC: 0.5 MG/DL
BUN SERPL-MCNC: 24 MG/DL
CALCIUM SERPL-MCNC: 9.3 MG/DL
CHLORIDE SERPL-SCNC: 108 MMOL/L
CHOLEST SERPL-MCNC: 134 MG/DL
CHOLEST/HDLC SERPL: 2.8 {RATIO}
CO2 SERPL-SCNC: 25 MMOL/L
COMPLEXED PSA SERPL-MCNC: 1.2 NG/ML
CREAT SERPL-MCNC: 1.1 MG/DL
DIFFERENTIAL METHOD: ABNORMAL
EOSINOPHIL # BLD AUTO: 0.1 K/UL
EOSINOPHIL NFR BLD: 2.5 %
ERYTHROCYTE [DISTWIDTH] IN BLOOD BY AUTOMATED COUNT: 12.7 %
EST. GFR  (AFRICAN AMERICAN): >60 ML/MIN/1.73 M^2
EST. GFR  (NON AFRICAN AMERICAN): >60 ML/MIN/1.73 M^2
ESTIMATED AVG GLUCOSE: 114 MG/DL
GLUCOSE SERPL-MCNC: 95 MG/DL
HBA1C MFR BLD HPLC: 5.6 %
HCT VFR BLD AUTO: 42.9 %
HDLC SERPL-MCNC: 48 MG/DL
HDLC SERPL: 35.8 %
HGB BLD-MCNC: 14 G/DL
IMM GRANULOCYTES # BLD AUTO: 0.01 K/UL
IMM GRANULOCYTES NFR BLD AUTO: 0.2 %
LDLC SERPL CALC-MCNC: 70.6 MG/DL
LYMPHOCYTES # BLD AUTO: 1.8 K/UL
LYMPHOCYTES NFR BLD: 37.1 %
MCH RBC QN AUTO: 31.5 PG
MCHC RBC AUTO-ENTMCNC: 32.6 G/DL
MCV RBC AUTO: 97 FL
MONOCYTES # BLD AUTO: 0.5 K/UL
MONOCYTES NFR BLD: 10.7 %
NEUTROPHILS # BLD AUTO: 2.3 K/UL
NEUTROPHILS NFR BLD: 48.9 %
NONHDLC SERPL-MCNC: 86 MG/DL
NRBC BLD-RTO: 0 /100 WBC
PLATELET # BLD AUTO: 197 K/UL
PMV BLD AUTO: 10.1 FL
POTASSIUM SERPL-SCNC: 4.4 MMOL/L
PROT SERPL-MCNC: 6.6 G/DL
RBC # BLD AUTO: 4.44 M/UL
SODIUM SERPL-SCNC: 142 MMOL/L
TRIGL SERPL-MCNC: 77 MG/DL
TSH SERPL DL<=0.005 MIU/L-ACNC: 2.25 UIU/ML
WBC # BLD AUTO: 4.77 K/UL

## 2019-02-20 PROCEDURE — 84443 ASSAY THYROID STIM HORMONE: CPT

## 2019-02-20 PROCEDURE — 83036 HEMOGLOBIN GLYCOSYLATED A1C: CPT

## 2019-02-20 PROCEDURE — 84153 ASSAY OF PSA TOTAL: CPT

## 2019-02-20 PROCEDURE — 80053 COMPREHEN METABOLIC PANEL: CPT

## 2019-02-20 PROCEDURE — 80061 LIPID PANEL: CPT

## 2019-02-20 PROCEDURE — 85025 COMPLETE CBC W/AUTO DIFF WBC: CPT

## 2019-02-20 PROCEDURE — 36415 COLL VENOUS BLD VENIPUNCTURE: CPT | Mod: PO

## 2019-02-27 ENCOUNTER — TELEPHONE (OUTPATIENT)
Dept: GASTROENTEROLOGY | Facility: CLINIC | Age: 64
End: 2019-02-27

## 2019-02-27 ENCOUNTER — OFFICE VISIT (OUTPATIENT)
Dept: INTERNAL MEDICINE | Facility: CLINIC | Age: 64
End: 2019-02-27
Payer: COMMERCIAL

## 2019-02-27 VITALS
BODY MASS INDEX: 28.11 KG/M2 | HEART RATE: 66 BPM | RESPIRATION RATE: 18 BRPM | SYSTOLIC BLOOD PRESSURE: 100 MMHG | WEIGHT: 200.81 LBS | DIASTOLIC BLOOD PRESSURE: 80 MMHG | TEMPERATURE: 98 F | HEIGHT: 71 IN

## 2019-02-27 DIAGNOSIS — I25.118 CORONARY ARTERY DISEASE OF NATIVE ARTERY OF NATIVE HEART WITH STABLE ANGINA PECTORIS: Chronic | ICD-10-CM

## 2019-02-27 DIAGNOSIS — Z00.00 ANNUAL PHYSICAL EXAM: Primary | ICD-10-CM

## 2019-02-27 DIAGNOSIS — Z12.11 SCREEN FOR COLON CANCER: ICD-10-CM

## 2019-02-27 DIAGNOSIS — K21.9 GASTROESOPHAGEAL REFLUX DISEASE, ESOPHAGITIS PRESENCE NOT SPECIFIED: Chronic | ICD-10-CM

## 2019-02-27 DIAGNOSIS — I10 ESSENTIAL HYPERTENSION: ICD-10-CM

## 2019-02-27 PROCEDURE — 99396 PREV VISIT EST AGE 40-64: CPT | Mod: 25,S$GLB,, | Performed by: INTERNAL MEDICINE

## 2019-02-27 PROCEDURE — 90471 TD VACCINE GREATER THAN OR EQUAL TO 7YO PRESERVATIVE FREE IM: ICD-10-PCS | Mod: S$GLB,,, | Performed by: INTERNAL MEDICINE

## 2019-02-27 PROCEDURE — 99999 PR PBB SHADOW E&M-EST. PATIENT-LVL IV: CPT | Mod: PBBFAC,,, | Performed by: INTERNAL MEDICINE

## 2019-02-27 PROCEDURE — 3074F PR MOST RECENT SYSTOLIC BLOOD PRESSURE < 130 MM HG: ICD-10-PCS | Mod: CPTII,S$GLB,, | Performed by: INTERNAL MEDICINE

## 2019-02-27 PROCEDURE — 90714 TD VACCINE GREATER THAN OR EQUAL TO 7YO PRESERVATIVE FREE IM: ICD-10-PCS | Mod: S$GLB,,, | Performed by: INTERNAL MEDICINE

## 2019-02-27 PROCEDURE — 3074F SYST BP LT 130 MM HG: CPT | Mod: CPTII,S$GLB,, | Performed by: INTERNAL MEDICINE

## 2019-02-27 PROCEDURE — 90714 TD VACC NO PRESV 7 YRS+ IM: CPT | Mod: S$GLB,,, | Performed by: INTERNAL MEDICINE

## 2019-02-27 PROCEDURE — 3079F PR MOST RECENT DIASTOLIC BLOOD PRESSURE 80-89 MM HG: ICD-10-PCS | Mod: CPTII,S$GLB,, | Performed by: INTERNAL MEDICINE

## 2019-02-27 PROCEDURE — 99396 PR PREVENTIVE VISIT,EST,40-64: ICD-10-PCS | Mod: 25,S$GLB,, | Performed by: INTERNAL MEDICINE

## 2019-02-27 PROCEDURE — 99999 PR PBB SHADOW E&M-EST. PATIENT-LVL IV: ICD-10-PCS | Mod: PBBFAC,,, | Performed by: INTERNAL MEDICINE

## 2019-02-27 PROCEDURE — 90471 IMMUNIZATION ADMIN: CPT | Mod: S$GLB,,, | Performed by: INTERNAL MEDICINE

## 2019-02-27 PROCEDURE — 3079F DIAST BP 80-89 MM HG: CPT | Mod: CPTII,S$GLB,, | Performed by: INTERNAL MEDICINE

## 2019-02-27 RX ORDER — IRBESARTAN 150 MG/1
150 TABLET ORAL NIGHTLY
Qty: 90 TABLET | Refills: 3 | Status: SHIPPED | OUTPATIENT
Start: 2019-02-27 | End: 2019-07-02

## 2019-02-27 RX ORDER — ATORVASTATIN CALCIUM 40 MG/1
40 TABLET, FILM COATED ORAL DAILY
Qty: 90 TABLET | Refills: 3 | Status: SHIPPED | OUTPATIENT
Start: 2019-02-27 | End: 2019-06-26 | Stop reason: SDUPTHER

## 2019-02-27 RX ORDER — PANTOPRAZOLE SODIUM 40 MG/1
40 TABLET, DELAYED RELEASE ORAL 2 TIMES DAILY
Qty: 180 TABLET | Refills: 3 | Status: SHIPPED | OUTPATIENT
Start: 2019-02-27 | End: 2020-03-31

## 2019-02-27 NOTE — PROGRESS NOTES
Subjective:       Patient ID: Gerard L Schoen III is a 63 y.o. male.    Chief Complaint: Annual Exam    HPI     63 y.o. male here for annual exam.     Cholesterol: WNL  Vaccines: Influenza - done; Tetanus - not sure; Zoster - needs  Sexual Screening:   STD screening: no concern  Eye exam: done last a while ago.    Prostate: 1.2  Colonoscopy: , due 2018  A1c: 5.6    Exercise: does this 6 days a week.  The back pain is better avoiding the treadmill.  Diet:  Poor diet.  He is no disciplined.  He does fast food and does smoothies for lunch.  Nighttime, eats trash.    He cut back on the treadmill and had improvement in his pain.    He has trouble with reflux and this is manageable on the medication.    Past Medical History:   Diagnosis Date    Coronary artery disease involving native coronary artery of native heart without angina pectoris 2017    Essential hypertension 2019    GERD (gastroesophageal reflux disease)      Past Surgical History:   Procedure Laterality Date    COLONOSCOPY      diverticulosis    COLONOSCOPY N/A 2015    Performed by [facility]el [facility] at [facility] (4TH FLR)    ESOPHAGOGASTRODUODENOSCOPY (EGD) N/A 2015    Performed by Silverio Patel MD at [facility] (4TH FLR)     Social History     Socioeconomic History    Marital status:      Spouse name: Andria    Number of children: 1    Years of education: Not on file    Highest education level: Not on file   Social Needs    Financial resource strain: Not on file    Food insecurity - worry: Not on file    Food insecurity - inability: Not on file    Transportation needs - medical: Not on file    Transportation needs - non-medical: Not on file   Occupational History    Occupation:      Employer: [employer]     Comment: [place]    Tobacco Use    Smoking status: Former Smoker     Types: Cigarettes     Last attempt to quit: 2006     Years since quittin.1    Smokeless tobacco:  Former User   Substance and Sexual Activity    Alcohol use: No     Frequency: Never    Drug use: No    Sexual activity: Yes     Partners: Female     Comment:    Other Topics Concern    Not on file   Social History Narrative    Not on file     Review of patient's allergies indicates:  No Known Allergies  Gerard Jerry Schoen L. Schoen III had no medications administered during this visit.    Review of Systems   Constitutional: Negative for activity change and unexpected weight change.   HENT: Negative for hearing loss, rhinorrhea and trouble swallowing.    Eyes: Negative for discharge and visual disturbance.   Respiratory: Negative for chest tightness and wheezing.    Cardiovascular: Negative for chest pain and palpitations.   Gastrointestinal: Negative for blood in stool, constipation, diarrhea and vomiting.   Endocrine: Negative for polydipsia and polyuria.   Genitourinary: Negative for difficulty urinating, hematuria and urgency.   Musculoskeletal: Negative for arthralgias, joint swelling and neck pain.   Neurological: Negative for weakness and headaches.   Psychiatric/Behavioral: Negative for confusion and dysphoric mood.       Objective:      Physical Exam   Constitutional: He is oriented to person, place, and time. He appears well-developed and well-nourished.   HENT:   Head: Normocephalic and atraumatic.   Mouth/Throat: No oropharyngeal exudate.   Eyes: EOM are normal. Pupils are equal, round, and reactive to light. Right eye exhibits no discharge. Left eye exhibits no discharge. No scleral icterus.   Neck: Normal range of motion. Neck supple. No tracheal deviation present. No thyromegaly present.   Cardiovascular: Normal rate, regular rhythm and normal heart sounds. Exam reveals no gallop and no friction rub.   No murmur heard.  Pulmonary/Chest: Effort normal and breath sounds normal. No respiratory distress. He has no wheezes. He has no rales. He exhibits no tenderness.   Abdominal: Soft. Bowel  sounds are normal. He exhibits no distension and no mass. There is no tenderness. There is no rebound and no guarding.   Musculoskeletal: Normal range of motion. He exhibits no edema or tenderness.   Neurological: He is alert and oriented to person, place, and time.   Skin: Skin is warm and dry. No rash noted. No erythema. No pallor.   Psychiatric: He has a normal mood and affect. His behavior is normal.   Vitals reviewed.      Assessment:       1. Annual physical exam    2. Essential hypertension    3. Gastroesophageal reflux disease, esophagitis presence not specified    4. Coronary artery disease of native artery of native heart with stable angina pectoris        Plan:       1.  Reviewed lab work with patient.  Tetanus vaccine ordered.  Shingles vaccine ordered.  Discussed side effects.  Colonoscopy ordered.  2.  Continue Lipitor 40 mg.  3.  Continue Protonix 40 mg b.i.d..  4.  Continue aspirin 81 mg, Lipitor 40 mg.  5.  Colonoscopy ordered.    Return to clinic in 6 months or sooner if needed.

## 2019-02-27 NOTE — TELEPHONE ENCOUNTER
Attempted to contact patient about scheduling a Colonoscopy. Left patient a message to give office a call back

## 2019-04-08 ENCOUNTER — PATIENT MESSAGE (OUTPATIENT)
Dept: INTERNAL MEDICINE | Facility: CLINIC | Age: 64
End: 2019-04-08

## 2019-05-13 ENCOUNTER — OFFICE VISIT (OUTPATIENT)
Dept: URGENT CARE | Facility: CLINIC | Age: 64
End: 2019-05-13
Payer: COMMERCIAL

## 2019-05-13 VITALS
DIASTOLIC BLOOD PRESSURE: 81 MMHG | WEIGHT: 182 LBS | TEMPERATURE: 97 F | OXYGEN SATURATION: 97 % | SYSTOLIC BLOOD PRESSURE: 122 MMHG | HEIGHT: 71 IN | RESPIRATION RATE: 19 BRPM | HEART RATE: 71 BPM | BODY MASS INDEX: 25.48 KG/M2

## 2019-05-13 DIAGNOSIS — J01.90 ACUTE BACTERIAL SINUSITIS: Primary | ICD-10-CM

## 2019-05-13 DIAGNOSIS — B96.89 ACUTE BACTERIAL SINUSITIS: Primary | ICD-10-CM

## 2019-05-13 PROCEDURE — 3008F PR BODY MASS INDEX (BMI) DOCUMENTED: ICD-10-PCS | Mod: CPTII,S$GLB,, | Performed by: INTERNAL MEDICINE

## 2019-05-13 PROCEDURE — 96372 PR INJECTION,THERAP/PROPH/DIAG2ST, IM OR SUBCUT: ICD-10-PCS | Mod: S$GLB,,, | Performed by: INTERNAL MEDICINE

## 2019-05-13 PROCEDURE — 3074F PR MOST RECENT SYSTOLIC BLOOD PRESSURE < 130 MM HG: ICD-10-PCS | Mod: CPTII,S$GLB,, | Performed by: INTERNAL MEDICINE

## 2019-05-13 PROCEDURE — 96372 THER/PROPH/DIAG INJ SC/IM: CPT | Mod: S$GLB,,, | Performed by: INTERNAL MEDICINE

## 2019-05-13 PROCEDURE — 3079F DIAST BP 80-89 MM HG: CPT | Mod: CPTII,S$GLB,, | Performed by: INTERNAL MEDICINE

## 2019-05-13 PROCEDURE — 3074F SYST BP LT 130 MM HG: CPT | Mod: CPTII,S$GLB,, | Performed by: INTERNAL MEDICINE

## 2019-05-13 PROCEDURE — 3008F BODY MASS INDEX DOCD: CPT | Mod: CPTII,S$GLB,, | Performed by: INTERNAL MEDICINE

## 2019-05-13 PROCEDURE — 99214 OFFICE O/P EST MOD 30 MIN: CPT | Mod: 25,S$GLB,, | Performed by: INTERNAL MEDICINE

## 2019-05-13 PROCEDURE — 3079F PR MOST RECENT DIASTOLIC BLOOD PRESSURE 80-89 MM HG: ICD-10-PCS | Mod: CPTII,S$GLB,, | Performed by: INTERNAL MEDICINE

## 2019-05-13 PROCEDURE — 99214 PR OFFICE/OUTPT VISIT, EST, LEVL IV, 30-39 MIN: ICD-10-PCS | Mod: 25,S$GLB,, | Performed by: INTERNAL MEDICINE

## 2019-05-13 RX ORDER — DOXYCYCLINE 100 MG/1
100 CAPSULE ORAL 2 TIMES DAILY
Qty: 14 CAPSULE | Refills: 0 | Status: SHIPPED | OUTPATIENT
Start: 2019-05-13 | End: 2019-05-20

## 2019-05-13 RX ORDER — BETAMETHASONE SODIUM PHOSPHATE AND BETAMETHASONE ACETATE 3; 3 MG/ML; MG/ML
9 INJECTION, SUSPENSION INTRA-ARTICULAR; INTRALESIONAL; INTRAMUSCULAR; SOFT TISSUE ONCE
Status: COMPLETED | OUTPATIENT
Start: 2019-05-13 | End: 2019-05-13

## 2019-05-13 RX ADMIN — BETAMETHASONE SODIUM PHOSPHATE AND BETAMETHASONE ACETATE 9 MG: 3; 3 INJECTION, SUSPENSION INTRA-ARTICULAR; INTRALESIONAL; INTRAMUSCULAR; SOFT TISSUE at 10:05

## 2019-05-13 NOTE — PROGRESS NOTES
"Subjective:       Patient ID: Gerard L Schoen III is a 63 y.o. male.    Vitals:  height is 5' 11" (1.803 m) and weight is 82.6 kg (182 lb). His oral temperature is 97.1 °F (36.2 °C). His blood pressure is 122/81 and his pulse is 71. His respiration is 19 and oxygen saturation is 97%.     Chief Complaint: Sinus Problem    Patient was seen 4 days ago and was given a steroid shot, antibiotic shot, and antibiotics but claims symptoms have worsened.    Sinus Problem   This is a new problem. The current episode started in the past 7 days (6 days). The problem is unchanged. There has been no fever. He is experiencing no pain. Associated symptoms include congestion. Pertinent negatives include no chills, coughing, diaphoresis, ear pain, headaches, hoarse voice, neck pain, shortness of breath, sinus pressure, sneezing, sore throat or swollen glands. (Postnasal drip) Past treatments include antibiotics. The treatment provided no relief.       Constitution: Negative for chills, sweating, fatigue and fever.   HENT: Positive for congestion. Negative for ear pain, sinus pressure and sore throat.    Neck: Negative for neck pain and painful lymph nodes.   Cardiovascular: Negative for chest pain and leg swelling.   Eyes: Negative for double vision and blurred vision.   Respiratory: Negative for cough and shortness of breath.    Gastrointestinal: Negative for nausea, vomiting and diarrhea.   Genitourinary: Negative for dysuria, frequency and urgency.   Musculoskeletal: Negative for joint pain, joint swelling, muscle cramps and muscle ache.   Skin: Negative for color change, pale and rash.   Allergic/Immunologic: Negative for seasonal allergies and sneezing.   Neurological: Negative for dizziness, history of vertigo, light-headedness, passing out and headaches.   Hematologic/Lymphatic: Negative for swollen lymph nodes, easy bruising/bleeding and history of blood clots. Does not bruise/bleed easily.   Psychiatric/Behavioral: Negative " for nervous/anxious, sleep disturbance and depression. The patient is not nervous/anxious.        Objective:      Physical Exam   Constitutional: He appears well-developed and well-nourished.   HENT:   Head: Normocephalic and atraumatic.   Nose: Mucosal edema (bloody purulent mucous drainage) present. Right sinus exhibits maxillary sinus tenderness and frontal sinus tenderness. Left sinus exhibits maxillary sinus tenderness and frontal sinus tenderness.   Eyes: Pupils are equal, round, and reactive to light. Conjunctivae and EOM are normal.   Neck: Normal range of motion. Neck supple.   Cardiovascular: Normal rate and regular rhythm.   Pulmonary/Chest: Effort normal and breath sounds normal.   Nursing note and vitals reviewed.      Assessment:       1. Acute bacterial sinusitis        Plan:         Acute bacterial sinusitis  -     betamethasone acetate-betamethasone sodium phosphate injection 9 mg  -     doxycycline (VIBRAMYCIN) 100 MG Cap; Take 1 capsule (100 mg total) by mouth 2 (two) times daily. for 7 days  Dispense: 14 capsule; Refill: 0

## 2019-05-13 NOTE — PATIENT INSTRUCTIONS
Take mucinex as directed; use over the counter flonase next few weeks; use antihistamine in pm for nasal drip at night

## 2019-06-26 RX ORDER — ATORVASTATIN CALCIUM 40 MG/1
40 TABLET, FILM COATED ORAL DAILY
Qty: 90 TABLET | Refills: 3 | Status: SHIPPED | OUTPATIENT
Start: 2019-06-26 | End: 2019-11-23 | Stop reason: SDUPTHER

## 2019-06-27 ENCOUNTER — TELEPHONE (OUTPATIENT)
Dept: INTERNAL MEDICINE | Facility: CLINIC | Age: 64
End: 2019-06-27

## 2019-06-27 DIAGNOSIS — H90.3 SENSORY HEARING LOSS, BILATERAL: Primary | ICD-10-CM

## 2019-06-27 NOTE — TELEPHONE ENCOUNTER
----- Message from Nakia Lindo sent at 6/27/2019  8:36 AM CDT -----  Regarding: Hearing Exam Order  This patient is coming in for an audiogram on 7/1. Can you please enter an order for ambulatory hearing consult to audiology. Diagnosis code: H90.3.     Thanks   Nakia Lindo   Audiology Clinical    [facility]

## 2019-06-28 NOTE — PROGRESS NOTES
"     Cardiology Clinic Note  Reason for Visit: CAD    HPI:     Gerard L Schoen III is a 63 y.o. M with CAD s/p STEMI with PCI to LCx, who presents for follow up.     He reports that he has been doing well since our last visit. He does have orthostasis symptoms on occasion since changing from lisinopril to irbesartan. He has also started the Sensible Plan diet, which focuses on portion control. He continues to exercise 6 days per week on either the elliptical for 47 minutes or the treadmill for 45 minutes without ischemia symptoms. He does have back discomfort or shoulder discomfort due to "pounding" with exercise.     At the time of his MI, he does not remember symptoms. He went to sleep complaining of heartburn symptoms and was found to be in cardiac arrest by his wife.    Medical: CAD s/p STEMI with PCI to LCx (2017)  Surgical: none  Family: sister  CHF at 48yo, both parents decreased from cancer, grandparents with MIs  Social: smoked >30y (<1 PPD, quit ; has not smoked since his MI), occasional EtOH, his wife is an RN at [employer] in the day of surgery center    [facility] 7/15/17  LAD - large vessel, wraps around apex, diffuse intimal disease, distal 60% stenosis  D1 - moderate to large size, prox 50% stenosis  LCx - totally occluded in mid vessel (s/p Resolute Integrity 2.75mm x 18mm stent)  RCA - large co-dominant vessel, prox 40% stenosis, mid 40% stenosis  LVEF 45%    ROS:    Constitution: Negative for fever or chills.  HENT: Negative for  headaches.  Eyes: Negative for blurred vision.   Cardiovascular: See above  Pulmonary: Negative for SOB. Negative for cough.   Gastrointestinal: Negative for nausea/vomiting.   : Negative for dysuria.   Skin: Negative for rashes.  Neurological: Negative for focal weakness.  Psychological: Negative for depression.    Medications:     Current Outpatient Medications on File Prior to Visit   Medication Sig Dispense Refill    aspirin (ECOTRIN) 81 MG EC " "tablet Take 81 mg by mouth once daily.  0    atorvastatin (LIPITOR) 40 MG tablet Take 1 tablet (40 mg total) by mouth once daily. 90 tablet 3    docusate sodium (COLACE) 100 MG capsule Take 100 mg by mouth once daily.      gabapentin (NEURONTIN) 300 MG capsule Take 1 capsule (300 mg total) by mouth every evening. 30 capsule 11    irbesartan (AVAPRO) 150 MG tablet Take 1 tablet (150 mg total) by mouth every evening. 90 tablet 3    Lactobac no.30/Bifidobact no.4 (ULTIMATE BRENT PROBIOTIC ORAL) Take 1 tablet by mouth 2 (two) times daily.      naproxen (NAPROSYN) 500 MG tablet Take 1 tablet (500 mg total) by mouth 2 (two) times daily. 60 tablet 2    pantoprazole (PROTONIX) 40 MG tablet Take 1 tablet (40 mg total) by mouth 2 (two) times daily. 180 tablet 3     No current facility-administered medications on file prior to visit.        Physical Exam:   /69 (BP Location: Left arm, Patient Position: Sitting, BP Method: Large (Automatic))   Pulse 66   Ht 5' 11.5" (1.816 m)   Wt 83.7 kg (184 lb 8.4 oz)   BMI 25.38 kg/m²      Constitutional: No apparent distress, conversant  HEENT: Sclera anicteric, extraocular movements intact  Neck: No jugular venous distension, no carotid bruits  CV: Regular rate and rhythm, no murmurs rubs or gallops, normal S1/S2  Pulm: Clear to auscultation bilaterally  GI: Abdomen soft, no palpable masses  Extremities: No lower extremity edema, warm with palpable pulses  Skin: No ecchymosis, erythema, or ulcers  Psych: Alert and oriented to person place location, appropriate affect  Neuro: No focal deficits    Labs:     Blood Tests:  Lab Results   Component Value Date     02/20/2019    K 4.4 02/20/2019     02/20/2019    CO2 25 02/20/2019    BUN 24 (H) 02/20/2019    CREATININE 1.1 02/20/2019    GLU 95 02/20/2019    HGBA1C 5.6 02/20/2019    AST 26 02/20/2019    ALT 29 02/20/2019    ALBUMIN 3.8 02/20/2019    PROT 6.6 02/20/2019    BILITOT 0.5 02/20/2019    WBC 4.77 02/20/2019 "    HGB 14.0 02/20/2019    HCT 42.9 02/20/2019    MCV 97 02/20/2019     02/20/2019    PSA 1.2 02/20/2019    TSH 2.248 02/20/2019       Lab Results   Component Value Date    CHOL 134 02/20/2019    HDL 48 02/20/2019    TRIG 77 02/20/2019       Lab Results   Component Value Date    LDLCALC 70.6 02/20/2019       Urine Tests:  Lab Results   Component Value Date    COLORU YELLOW 03/25/2004    APPEARANCEUA Hazy (A) 03/25/2004    PHUR 5.5 03/25/2004    SPECGRAV 1.018 03/25/2004    PROTEINUA NEG 03/25/2004    GLUCUA NEG 03/25/2004    KETONESU NEG 03/25/2004    BILIRUBINUA NEG 03/25/2004    OCCULTUA TR (A) 03/25/2004    NITRITE POS (A) 03/25/2004    UROBILINOGEN 0.2 03/25/2004    LEUKOCYTESUR TR (A) 03/25/2004       Imaging:     Echocardiogram  None    Stress testing  [facility] 9/27/17  PRE-TEST DATA   EKG: Resting electrocardiogram reveals normal sinus rhythm at a rate of 65 bpm.     TEST DESCRIPTION   The patient exercised for 8.08 minutes on a High Ramp protocol, corresponding to a functional capacity of 13 estimated METS, achieving a peak heart rate of 116 bpm, which is 73% of the age predicted maximum heart rate. The patient discontinued exercise   secondary to fatigue.     There were no significant electrocardiographic changes throughout the protocol suggesting ischemia.     EKG Conclusions:  1. The EKG portion of this study is negative for ischemia at a high workload, and peak heart rate of 116 bpm (73% of predicted).   2. Exercise capacity is average.   3. Blood pressure response to exercise was normal (Presenting BP: 106/71 Peak BP: 156/85).   4. The following arrhythmias were present: rare PVCs.   5. There were no symptoms of chest discomfort or significant dyspnea throughout the protocol.   6. The Duke treadmill score was 8 suggesting a low probability for future cardiovascular events.    CONCLUSIONS     1 - Normal left ventricular systolic function (EF 60-65%).     2 - No wall motion abnormalities.     3 - Normal  left ventricular diastolic function.     4 - Normal right ventricular systolic function .     No evidence of stress induced myocardial ischemia.     Impression: NORMAL MYOCARDIAL PERFUSION  1. The perfusion scan is free of evidence for myocardial ischemia or injury.   2. Resting wall motion is physiologic.   3. Resting LV function is normal.  (normal is >= 51%)  4. The ventricular volumes are normal at rest and stress.   5. The extracardiac distribution of radioactivity is normal.     SPECT 16  Impression: NORMAL MYOCARDIAL PERFUSION  1. The perfusion scan is free of evidence for myocardial ischemia or injury.   2. Resting wall motion is physiologic.   3. Resting LV function is normal.  (normal is >= 51%)  4. The ventricular volumes are normal at rest and stress.   5. The extracardiac distribution of radioactivity is normal.     Cath Lab  [facility] - [facility] 7/15/17  LAD - large vessel, wraps around apex, diffuse intimal disease, distal 60% stenosis  D1 - moderate to large size, prox 50% stenosis  LCx - totally occluded in mid vessel (s/p Resolute Integrity 2.75mm x 18mm stent)  RCA - large co-dominant vessel, prox 40% stenosis, mid 40% stenosis  LVEF 45%    Other  CPX 17  CPX Conclusions:  Normal functional status associated with a reduced breathing reserve, normal oxygen saturation, an excellent effort, and a normal AT. No clinically significant functional impairment. Possible sub-clinical ventilatory defect (see text above.)    EK17 - sinus rhythm with 1st degree AVB, LAD    Assessment:     1. Coronary artery disease of native artery of native heart with stable angina pectoris    2. Essential hypertension    3. S/P coronary angioplasty    4. History of MI (myocardial infarction)      Plan:     Coronary artery disease of native artery of native heart with stable angina pectoris  S/p PCI to LCx (2017)  LVEF decreased to 45% initially but had subsequent recover with LVEF 60-65% on echo  9/2017.  Continue aspirin  81mg daily indefinitely  Lipid profile 2/20/19 with LDL 70 (at goal). Continue atorvastatin 40mg daily.  Encouraged continued regular aerobic exercise of 30-45 minutes/day, 5-7x/week.  No metoprolol at this time due to resting heart rate in 60s  Discussed Mediterranean diet.     Hypertension  Due to orthostasis symptoms, will decrease irbesartan to 75mg daily    Follow up in 6 months in [place] [facility]    Signed:  Reza Goodman MD  Cardiology     7/2/2019 9:07 AM    Follow-up:     Future Appointments   Date Time Provider Department Center   7/2/2019  8:30 AM Salvador Goodman III, MD [facility]   7/2/2019 10:00 AM AUDIOGRAM, AUDIO [facility]UD[facility]

## 2019-07-02 ENCOUNTER — CLINICAL SUPPORT (OUTPATIENT)
Dept: AUDIOLOGY | Facility: CLINIC | Age: 64
End: 2019-07-02
Payer: COMMERCIAL

## 2019-07-02 ENCOUNTER — OFFICE VISIT (OUTPATIENT)
Dept: CARDIOLOGY | Facility: CLINIC | Age: 64
End: 2019-07-02
Payer: COMMERCIAL

## 2019-07-02 VITALS
HEART RATE: 66 BPM | HEIGHT: 72 IN | DIASTOLIC BLOOD PRESSURE: 69 MMHG | BODY MASS INDEX: 24.99 KG/M2 | WEIGHT: 184.5 LBS | SYSTOLIC BLOOD PRESSURE: 100 MMHG

## 2019-07-02 DIAGNOSIS — Z98.61 S/P CORONARY ANGIOPLASTY: ICD-10-CM

## 2019-07-02 DIAGNOSIS — H93.299 ABNORMAL AUDITORY PERCEPTION, UNSPECIFIED LATERALITY: Primary | ICD-10-CM

## 2019-07-02 DIAGNOSIS — I25.118 CORONARY ARTERY DISEASE OF NATIVE ARTERY OF NATIVE HEART WITH STABLE ANGINA PECTORIS: Primary | Chronic | ICD-10-CM

## 2019-07-02 DIAGNOSIS — I10 ESSENTIAL HYPERTENSION: Chronic | ICD-10-CM

## 2019-07-02 DIAGNOSIS — I25.2 HISTORY OF MI (MYOCARDIAL INFARCTION): ICD-10-CM

## 2019-07-02 PROCEDURE — 99999 PR PBB SHADOW E&M-EST. PATIENT-LVL III: CPT | Mod: PBBFAC,,, | Performed by: INTERNAL MEDICINE

## 2019-07-02 PROCEDURE — 3008F BODY MASS INDEX DOCD: CPT | Mod: CPTII,S$GLB,, | Performed by: INTERNAL MEDICINE

## 2019-07-02 PROCEDURE — 3074F PR MOST RECENT SYSTOLIC BLOOD PRESSURE < 130 MM HG: ICD-10-PCS | Mod: CPTII,S$GLB,, | Performed by: INTERNAL MEDICINE

## 2019-07-02 PROCEDURE — 99214 PR OFFICE/OUTPT VISIT, EST, LEVL IV, 30-39 MIN: ICD-10-PCS | Mod: S$GLB,,, | Performed by: INTERNAL MEDICINE

## 2019-07-02 PROCEDURE — 99999 PR PBB SHADOW E&M-EST. PATIENT-LVL I: CPT | Mod: PBBFAC,,,

## 2019-07-02 PROCEDURE — 3078F PR MOST RECENT DIASTOLIC BLOOD PRESSURE < 80 MM HG: ICD-10-PCS | Mod: CPTII,S$GLB,, | Performed by: INTERNAL MEDICINE

## 2019-07-02 PROCEDURE — 99999 PR PBB SHADOW E&M-EST. PATIENT-LVL III: ICD-10-PCS | Mod: PBBFAC,,, | Performed by: INTERNAL MEDICINE

## 2019-07-02 PROCEDURE — 99999 PR PBB SHADOW E&M-EST. PATIENT-LVL I: ICD-10-PCS | Mod: PBBFAC,,,

## 2019-07-02 PROCEDURE — 3078F DIAST BP <80 MM HG: CPT | Mod: CPTII,S$GLB,, | Performed by: INTERNAL MEDICINE

## 2019-07-02 PROCEDURE — 3074F SYST BP LT 130 MM HG: CPT | Mod: CPTII,S$GLB,, | Performed by: INTERNAL MEDICINE

## 2019-07-02 PROCEDURE — 3008F PR BODY MASS INDEX (BMI) DOCUMENTED: ICD-10-PCS | Mod: CPTII,S$GLB,, | Performed by: INTERNAL MEDICINE

## 2019-07-02 PROCEDURE — 99214 OFFICE O/P EST MOD 30 MIN: CPT | Mod: S$GLB,,, | Performed by: INTERNAL MEDICINE

## 2019-07-02 PROCEDURE — 92557 COMPREHENSIVE HEARING TEST: CPT | Mod: S$GLB,,, | Performed by: AUDIOLOGIST

## 2019-07-02 PROCEDURE — 92557 PR COMPREHENSIVE HEARING TEST: ICD-10-PCS | Mod: S$GLB,,, | Performed by: AUDIOLOGIST

## 2019-07-02 RX ORDER — IRBESARTAN 75 MG/1
75 TABLET ORAL DAILY
Qty: 90 TABLET | Refills: 3 | Status: SHIPPED | OUTPATIENT
Start: 2019-07-02 | End: 2020-07-05 | Stop reason: SDUPTHER

## 2019-07-02 RX ORDER — SUCRALFATE 1 G/1
TABLET ORAL
Refills: 9 | COMMUNITY
Start: 2019-06-26 | End: 2022-07-07 | Stop reason: SDUPTHER

## 2019-11-25 RX ORDER — ATORVASTATIN CALCIUM 40 MG/1
TABLET, FILM COATED ORAL
Qty: 90 TABLET | Refills: 0 | Status: SHIPPED | OUTPATIENT
Start: 2019-11-25 | End: 2020-02-14 | Stop reason: SDUPTHER

## 2020-03-28 ENCOUNTER — OFFICE VISIT (OUTPATIENT)
Dept: URGENT CARE | Facility: CLINIC | Age: 65
End: 2020-03-28
Payer: COMMERCIAL

## 2020-03-28 VITALS
HEIGHT: 72 IN | WEIGHT: 180 LBS | OXYGEN SATURATION: 97 % | BODY MASS INDEX: 24.38 KG/M2 | TEMPERATURE: 100 F | HEART RATE: 67 BPM

## 2020-03-28 DIAGNOSIS — J32.9 SINUSITIS, UNSPECIFIED CHRONICITY, UNSPECIFIED LOCATION: Primary | ICD-10-CM

## 2020-03-28 PROCEDURE — 99214 OFFICE O/P EST MOD 30 MIN: CPT | Mod: S$GLB,,, | Performed by: FAMILY MEDICINE

## 2020-03-28 PROCEDURE — 99214 PR OFFICE/OUTPT VISIT, EST, LEVL IV, 30-39 MIN: ICD-10-PCS | Mod: S$GLB,,, | Performed by: FAMILY MEDICINE

## 2020-03-28 RX ORDER — AZITHROMYCIN 250 MG/1
TABLET, FILM COATED ORAL
Qty: 6 TABLET | Refills: 0 | Status: SHIPPED | OUTPATIENT
Start: 2020-03-28 | End: 2021-03-18

## 2020-03-28 NOTE — PROGRESS NOTES
"Subjective:       Patient ID: Gerard L Schoen III is a 64 y.o. male.    Vitals:  height is 5' 11.5" (1.816 m) and weight is 81.6 kg (180 lb). His tympanic temperature is 99.5 °F (37.5 °C). His pulse is 67. His oxygen saturation is 97%.     Chief Complaint: URI    URI    This is a new problem. The current episode started in the past 7 days (4 days ago). The problem has been gradually worsening. There has been no fever. Associated symptoms include congestion and rhinorrhea. Pertinent negatives include no abdominal pain, chest pain, coughing, diarrhea, dysuria, ear pain, headaches, joint pain, joint swelling, nausea, neck pain, plugged ear sensation, rash, sinus pain, sneezing, sore throat, swollen glands, vomiting or wheezing. Treatments tried: Immune pills. The treatment provided mild relief.       Constitution: Negative for chills, sweating, fatigue and fever.   HENT: Positive for congestion, postnasal drip and sinus pressure. Negative for ear pain, sinus pain, sore throat and voice change.    Neck: Negative for neck pain and painful lymph nodes.   Cardiovascular: Negative for chest pain.   Eyes: Negative for eye redness.   Respiratory: Negative for chest tightness, cough, sputum production, bloody sputum, COPD, shortness of breath, stridor, wheezing and asthma.    Gastrointestinal: Negative for abdominal pain, nausea, vomiting and diarrhea.   Genitourinary: Negative for dysuria.   Musculoskeletal: Negative for muscle ache.   Skin: Negative for rash.   Allergic/Immunologic: Negative for seasonal allergies, asthma and sneezing.   Neurological: Negative for headaches.   Hematologic/Lymphatic: Negative for swollen lymph nodes.       Objective:      Physical Exam   Constitutional: He is oriented to person, place, and time. He appears well-developed and well-nourished. He is cooperative.  Non-toxic appearance. He does not appear ill. No distress.   HENT:   Head: Normocephalic and atraumatic.   Right Ear: Hearing, " tympanic membrane, external ear and ear canal normal.   Left Ear: Hearing, tympanic membrane, external ear and ear canal normal.   Nose: Nose normal. No mucosal edema, rhinorrhea or nasal deformity. No epistaxis. Right sinus exhibits no maxillary sinus tenderness and no frontal sinus tenderness. Left sinus exhibits no maxillary sinus tenderness and no frontal sinus tenderness.   Mouth/Throat: Uvula is midline, oropharynx is clear and moist and mucous membranes are normal. No trismus in the jaw. Normal dentition. No uvula swelling. No posterior oropharyngeal erythema.   Bilateral maxillary sinus tenderness.     Eyes: Conjunctivae and lids are normal. Right eye exhibits no discharge. Left eye exhibits no discharge. No scleral icterus.   Neck: Trachea normal, normal range of motion, full passive range of motion without pain and phonation normal. Neck supple.   Cardiovascular: Normal rate, regular rhythm, normal heart sounds, intact distal pulses and normal pulses.   Pulmonary/Chest: Effort normal and breath sounds normal. No respiratory distress. He has no wheezes. He has no rales.   Abdominal: Soft. Normal appearance and bowel sounds are normal. He exhibits no distension and no pulsatile midline mass. There is no tenderness.   Musculoskeletal: Normal range of motion. He exhibits no edema or deformity.   Lymphadenopathy:     He has no cervical adenopathy.   Neurological: He is alert and oriented to person, place, and time. He exhibits normal muscle tone. Coordination normal.   Skin: Skin is warm, dry, intact, not diaphoretic and not pale.   Psychiatric: He has a normal mood and affect. His speech is normal and behavior is normal. Judgment and thought content normal. Cognition and memory are normal.   Nursing note and vitals reviewed.        Assessment:       1. Sinusitis, unspecified chronicity, unspecified location        Plan:         Sinusitis, unspecified chronicity, unspecified location  -     azithromycin (Z-RADHA)  250 MG tablet; Take 2 tablets by mouth x 1 for day 1 Then take 1 tablet by mouth daily for day 2 - 5  Dispense: 6 tablet; Refill: 0          Patient Instructions     Sinusitis (Antibiotic Treatment)    The sinuses are air-filled spaces within the bones of the face. They connect to the inside of the nose. Sinusitis is an inflammation of the tissue lining the sinus cavity. Sinus inflammation can occur during a cold. It can also be due to allergies to pollens and other particles in the air. Sinusitis can cause symptoms of sinus congestion and fullness. A sinus infection causes fever, headache and facial pain. There is often green or yellow drainage from the nose or into the back of the throat (post-nasal drip). You have been given antibiotics to treat this condition.  Home care:  · Take the full course of antibiotics as instructed. Do not stop taking them, even if you feel better.  · Drink plenty of water, hot tea, and other liquids. This may help thin mucus. It also may promote sinus drainage.  · Heat may help soothe painful areas of the face. Use a towel soaked in hot water. Or,  the shower and direct the hot spray onto your face. Using a vaporizer along with a menthol rub at night may also help.   · An expectorant containing guaifenesin may help thin the mucus and promote drainage from the sinuses.  · Over-the-counter decongestants may be used unless a similar medicine was prescribed. Nasal sprays work the fastest. Use one that contains phenylephrine or oxymetazoline. First blow the nose gently. Then use the spray. Do not use these medicines more often than directed on the label or symptoms may get worse. You may also use tablets containing pseudoephedrine. Avoid products that combine ingredients, because side effects may be increased. Read labels. You can also ask the pharmacist for help. (NOTE: Persons with high blood pressure should not use decongestants. They can raise blood  pressure.)  · Over-the-counter antihistamines may help if allergies contributed to your sinusitis.    · Do not use nasal rinses or irrigation during an acute sinus infection, unless told to by your health care provider. Rinsing may spread the infection to other sinuses.  · Use acetaminophen or ibuprofen to control pain, unless another pain medicine was prescribed. (If you have chronic liver or kidney disease or ever had a stomach ulcer, talk with your doctor before using these medicines. Aspirin should never be used in anyone under 18 years of age who is ill with a fever. It may cause severe liver damage.)  · Don't smoke. This can worsen symptoms.  Follow-up care  Follow up with your healthcare provider or our staff if you are not improving within the next week.  When to seek medical advice  Call your healthcare provider if any of these occur:  · Facial pain or headache becoming more severe  · Stiff neck  · Unusual drowsiness or confusion  · Swelling of the forehead or eyelids  · Vision problems, including blurred or double vision  · Fever of 100.4ºF (38ºC) or higher, or as directed by your healthcare provider  · Seizure  · Breathing problems  · Symptoms not resolving within 10 days  Date Last Reviewed: 4/13/2015  © 70[employer]4175 [employer]. [address] [place] [address], [place] [address]. All rights reserved. This information is not intended as a substitute for professional medical care. Always follow your healthcare professional's instructions.      Follow up with your doctor in a few days as needed.  Return to the urgent care or go to the ER if symptoms get worse.    Isreal Garcia MD

## 2020-03-28 NOTE — PATIENT INSTRUCTIONS
Sinusitis (Antibiotic Treatment)    The sinuses are air-filled spaces within the bones of the face. They connect to the inside of the nose. Sinusitis is an inflammation of the tissue lining the sinus cavity. Sinus inflammation can occur during a cold. It can also be due to allergies to pollens and other particles in the air. Sinusitis can cause symptoms of sinus congestion and fullness. A sinus infection causes fever, headache and facial pain. There is often green or yellow drainage from the nose or into the back of the throat (post-nasal drip). You have been given antibiotics to treat this condition.  Home care:  · Take the full course of antibiotics as instructed. Do not stop taking them, even if you feel better.  · Drink plenty of water, hot tea, and other liquids. This may help thin mucus. It also may promote sinus drainage.  · Heat may help soothe painful areas of the face. Use a towel soaked in hot water. Or,  the shower and direct the hot spray onto your face. Using a vaporizer along with a menthol rub at night may also help.   · An expectorant containing guaifenesin may help thin the mucus and promote drainage from the sinuses.  · Over-the-counter decongestants may be used unless a similar medicine was prescribed. Nasal sprays work the fastest. Use one that contains phenylephrine or oxymetazoline. First blow the nose gently. Then use the spray. Do not use these medicines more often than directed on the label or symptoms may get worse. You may also use tablets containing pseudoephedrine. Avoid products that combine ingredients, because side effects may be increased. Read labels. You can also ask the pharmacist for help. (NOTE: Persons with high blood pressure should not use decongestants. They can raise blood pressure.)  · Over-the-counter antihistamines may help if allergies contributed to your sinusitis.    · Do not use nasal rinses or irrigation during an acute sinus infection, unless told to by  your health care provider. Rinsing may spread the infection to other sinuses.  · Use acetaminophen or ibuprofen to control pain, unless another pain medicine was prescribed. (If you have chronic liver or kidney disease or ever had a stomach ulcer, talk with your doctor before using these medicines. Aspirin should never be used in anyone under 18 years of age who is ill with a fever. It may cause severe liver damage.)  · Don't smoke. This can worsen symptoms.  Follow-up care  Follow up with your healthcare provider or our staff if you are not improving within the next week.  When to seek medical advice  Call your healthcare provider if any of these occur:  · Facial pain or headache becoming more severe  · Stiff neck  · Unusual drowsiness or confusion  · Swelling of the forehead or eyelids  · Vision problems, including blurred or double vision  · Fever of 100.4ºF (38ºC) or higher, or as directed by your healthcare provider  · Seizure  · Breathing problems  · Symptoms not resolving within 10 days  Date Last Reviewed: 4/13/2015  © 1400-7334 [employer]. [address] [place] [address]. All rights reserved. This information is not intended as a substitute for professional medical care. Always follow your healthcare professional's instructions.      Follow up with your doctor in a few days as needed.  Return to the urgent care or go to the ER if symptoms get worse.    Isreal Garcia MD

## 2020-03-31 RX ORDER — PANTOPRAZOLE SODIUM 40 MG/1
TABLET, DELAYED RELEASE ORAL
Qty: 180 TABLET | Refills: 0 | Status: SHIPPED | OUTPATIENT
Start: 2020-03-31 | End: 2020-05-08 | Stop reason: SDUPTHER

## 2020-03-31 NOTE — TELEPHONE ENCOUNTER
Patient not seen in over year.  One refill done.  Needs an appointment get further refills.  Okay for virtual visit.

## 2020-04-26 ENCOUNTER — OFFICE VISIT (OUTPATIENT)
Dept: INTERNAL MEDICINE | Facility: CLINIC | Age: 65
End: 2020-04-26
Payer: COMMERCIAL

## 2020-04-26 DIAGNOSIS — U07.1 COVID-19 VIRUS INFECTION: Primary | ICD-10-CM

## 2020-04-26 PROCEDURE — 99213 OFFICE O/P EST LOW 20 MIN: CPT | Mod: 95,,, | Performed by: INTERNAL MEDICINE

## 2020-04-26 PROCEDURE — 99213 PR OFFICE/OUTPT VISIT, EST, LEVL III, 20-29 MIN: ICD-10-PCS | Mod: 95,,, | Performed by: INTERNAL MEDICINE

## 2020-04-26 NOTE — PROGRESS NOTES
Subjective:       Patient ID: Gerard L Schoen III is a 64 y.o. male.    Chief Complaint: COVID-19 Concerns    HPI     The patient location is: home   The chief complaint leading to consultation is: COVID  Total time spent with patient: 15 minutes  Visit type: Virtual visit with synchronous audio only and video  Each patient to whom he or she provides medical services by telemedicine is: (1) informed of the relationship between the physician and patient and the respective role of any other health care provider with respect to management of the patient; and (2) notified that he or she may decline to receive medical services by telemedicine and may withdraw from such care at any time.    65 yo male here for evaluation of COVID.  He reports that about a month ago, he was seen for a sinus infection and got a z-pack.  His wife tested positive at this time for COVID.  He lost is sense of smell and taste.  He was tested at [facility] and was found to be positive.  He has had symptoms for 3-3.5 weeks.  He has had fever to 102 and headaches.  He has had no respiratory problems with this.  He has had fatigue.  He has good days and regresses others.  The smell and taste have not returned as of yet.  He is not certain if he still has it.  He is wondering about going back to work.  He is in the  business - .  He can wear a mask at work.  He last had a fever was about a week after his positive test.  This was about .      Review of Systems    Objective:      Physical Exam   Constitutional: He appears well-developed and well-nourished. No distress.   HENT:   Head: Normocephalic and atraumatic.   Right Ear: External ear normal.   Left Ear: External ear normal.   Neck: Normal range of motion.   Pulmonary/Chest: Effort normal.   Skin: He is not diaphoretic.       Assessment:       1. COVID-19 virus infection        Plan:       1.  Work note provided for return to work on Wednesday.  Counseled patient to wear  a mask at work at all times.  Discussed that if he has worsening symptoms that he should contact this office again.

## 2020-04-26 NOTE — LETTER
April 26, 2020    Gerard L Schoen III  [address]  [place] [address]         [place] [facility]  [address] [facility].  [place] [address]  Phone: [phone]  Fax: [phone] April 26, 2020     Patient: Gerard L Schoen III   YOB: 1955   Date of Visit: 4/26/2020       To Whom It May Concern:    Gerard Schoen has been diagnosed with COVID and may return work 4/29 this week.  He has been afebrile for more than 3 days without the use of antipyretics.  He needs to wear a mask at work.    If you have any questions or concerns, please don't hesitate to call.    Sincerely,        Ata Montana MD

## 2020-04-27 ENCOUNTER — PATIENT MESSAGE (OUTPATIENT)
Dept: INTERNAL MEDICINE | Facility: CLINIC | Age: 65
End: 2020-04-27

## 2020-04-28 ENCOUNTER — TELEPHONE (OUTPATIENT)
Dept: INTERNAL MEDICINE | Facility: CLINIC | Age: 65
End: 2020-04-28

## 2020-04-28 ENCOUNTER — LAB VISIT (OUTPATIENT)
Dept: INTERNAL MEDICINE | Facility: CLINIC | Age: 65
End: 2020-04-28
Payer: COMMERCIAL

## 2020-04-28 DIAGNOSIS — U07.1 COVID-19 VIRUS INFECTION: Primary | ICD-10-CM

## 2020-04-28 DIAGNOSIS — U07.1 COVID-19 VIRUS DETECTED: ICD-10-CM

## 2020-04-28 DIAGNOSIS — U07.1 COVID-19 VIRUS INFECTION: ICD-10-CM

## 2020-04-28 LAB — SARS-COV-2 RNA RESP QL NAA+PROBE: DETECTED

## 2020-04-28 PROCEDURE — U0002 COVID-19 LAB TEST NON-CDC: HCPCS

## 2020-04-28 NOTE — TELEPHONE ENCOUNTER
Pt sent a message via portal and advised per his employer he is required to retest for COVID because his wife has tested positive 4 times. Please advise of retest concerns.

## 2020-05-06 ENCOUNTER — PATIENT MESSAGE (OUTPATIENT)
Dept: INTERNAL MEDICINE | Facility: CLINIC | Age: 65
End: 2020-05-06

## 2020-05-06 DIAGNOSIS — U07.1 COVID-19 VIRUS INFECTION: Primary | ICD-10-CM

## 2020-05-13 ENCOUNTER — LAB VISIT (OUTPATIENT)
Dept: INTERNAL MEDICINE | Facility: CLINIC | Age: 65
End: 2020-05-13
Payer: COMMERCIAL

## 2020-05-13 ENCOUNTER — TELEPHONE (OUTPATIENT)
Dept: INTERNAL MEDICINE | Facility: CLINIC | Age: 65
End: 2020-05-13

## 2020-05-13 DIAGNOSIS — U07.1 COVID-19 VIRUS INFECTION: ICD-10-CM

## 2020-05-13 DIAGNOSIS — U07.1 COVID-19 VIRUS INFECTION: Primary | ICD-10-CM

## 2020-05-13 LAB — SARS-COV-2 RNA RESP QL NAA+PROBE: DETECTED

## 2020-05-13 PROCEDURE — U0002 COVID-19 LAB TEST NON-CDC: HCPCS

## 2020-05-14 ENCOUNTER — PATIENT MESSAGE (OUTPATIENT)
Dept: RESEARCH | Facility: HOSPITAL | Age: 65
End: 2020-05-14

## 2020-05-26 RX ORDER — PANTOPRAZOLE SODIUM 40 MG/1
40 TABLET, DELAYED RELEASE ORAL 2 TIMES DAILY
Qty: 180 TABLET | Refills: 2 | Status: CANCELLED | OUTPATIENT
Start: 2020-05-26

## 2020-05-27 ENCOUNTER — LAB VISIT (OUTPATIENT)
Dept: INTERNAL MEDICINE | Facility: CLINIC | Age: 65
End: 2020-05-27
Payer: COMMERCIAL

## 2020-05-27 DIAGNOSIS — U07.1 COVID-19 VIRUS INFECTION: ICD-10-CM

## 2020-05-27 PROCEDURE — U0003 INFECTIOUS AGENT DETECTION BY NUCLEIC ACID (DNA OR RNA); SEVERE ACUTE RESPIRATORY SYNDROME CORONAVIRUS 2 (SARS-COV-2) (CORONAVIRUS DISEASE [COVID-19]), AMPLIFIED PROBE TECHNIQUE, MAKING USE OF HIGH THROUGHPUT TECHNOLOGIES AS DESCRIBED BY CMS-2020-01-R: HCPCS

## 2020-05-28 ENCOUNTER — PATIENT MESSAGE (OUTPATIENT)
Dept: INTERNAL MEDICINE | Facility: CLINIC | Age: 65
End: 2020-05-28

## 2020-05-28 LAB — SARS-COV-2 RNA RESP QL NAA+PROBE: DETECTED

## 2020-05-29 ENCOUNTER — PATIENT MESSAGE (OUTPATIENT)
Dept: INTERNAL MEDICINE | Facility: CLINIC | Age: 65
End: 2020-05-29

## 2020-05-30 ENCOUNTER — OFFICE VISIT (OUTPATIENT)
Dept: INTERNAL MEDICINE | Facility: CLINIC | Age: 65
End: 2020-05-30
Payer: COMMERCIAL

## 2020-05-30 DIAGNOSIS — U07.1 COVID-19 VIRUS INFECTION: Primary | ICD-10-CM

## 2020-05-30 PROCEDURE — 99213 OFFICE O/P EST LOW 20 MIN: CPT | Mod: 95,,, | Performed by: INTERNAL MEDICINE

## 2020-05-30 PROCEDURE — 99213 PR OFFICE/OUTPT VISIT, EST, LEVL III, 20-29 MIN: ICD-10-PCS | Mod: 95,,, | Performed by: INTERNAL MEDICINE

## 2020-05-30 RX ORDER — PANTOPRAZOLE SODIUM 40 MG/1
40 TABLET, DELAYED RELEASE ORAL 2 TIMES DAILY
Qty: 180 TABLET | Refills: 2 | Status: SHIPPED | OUTPATIENT
Start: 2020-05-30 | End: 2020-06-03

## 2020-05-30 NOTE — PROGRESS NOTES
Subjective:       Patient ID: Gerard L Schoen III is a 64 y.o. male.    Chief Complaint: COVID-19 Concerns    HPI     The patient location is: home  The chief complaint leading to consultation is: COVID  Total time spent with patient: 13 minutes  Visit type: Virtual visit with synchronous audio only and video  Each patient to whom he or she provides medical services by telemedicine is: (1) informed of the relationship between the physician and patient and the respective role of any other health care provider with respect to management of the patient; and (2) notified that he or she may decline to receive medical services by telemedicine and may withdraw from such care at any time.    63 yo male here for follow-up of COVID.  He reports that he is concerned about remaining positive for COVID.  He tested positive on April 3rd.  He had symptoms for two weeks - fever, fatigue, headaches.  In the last month or so, he has more saliva building up where he has to spit.  He has no restrictions physically.  He has had inklings of taste when he takes the first 2-3 bites of food.  He has tried zyrtec and other OTC antihistamines.      Review of Systems    Objective:      Physical Exam   Constitutional: He appears well-developed and well-nourished. No distress.   HENT:   Head: Normocephalic and atraumatic.   Right Ear: External ear normal.   Left Ear: External ear normal.   Neck: Normal range of motion.   Pulmonary/Chest: Effort normal.   Skin: He is not diaphoretic.       Assessment:       1. COVID-19 virus infection        Plan:       1.  Work note given.  Recommend patient wear a mask and wash hands regularly.  Explained that there are no recommendations for retesting and that we are not sure of what to do with positive results from retesting at this point, so would not /recommendations.

## 2020-05-30 NOTE — LETTER
May 30, 2020    Gerard L Schoen III  [address]  [place] [address]         [place] [facility]  [address] [facility].  [place] [address]  Phone: [phone]  Fax: [phone] May 30, 2020     Patient: Gerard L Schoen III   YOB: 1955   Date of Visit: 5/30/2020       To Whom It May Concern:    Gerard Schoen has been diagnosed with COVID and may return work 6/1 this week.  He has been afebrile for more than 3 days without the use of antipyretics.  He needs to wear a mask at work.  It is recommended that he wear a mask at all times while in public and should adhere to proper handwashing guidelines as well.    If you have any questions or concerns, please don't hesitate to call.    Sincerely,        Ata Montana MD

## 2020-06-09 ENCOUNTER — LAB VISIT (OUTPATIENT)
Dept: PRIMARY CARE CLINIC | Facility: OTHER | Age: 65
End: 2020-06-09
Payer: COMMERCIAL

## 2020-06-09 DIAGNOSIS — R43.9 PROBLEMS WITH SMELL AND TASTE: ICD-10-CM

## 2020-06-09 PROCEDURE — U0003 INFECTIOUS AGENT DETECTION BY NUCLEIC ACID (DNA OR RNA); SEVERE ACUTE RESPIRATORY SYNDROME CORONAVIRUS 2 (SARS-COV-2) (CORONAVIRUS DISEASE [COVID-19]), AMPLIFIED PROBE TECHNIQUE, MAKING USE OF HIGH THROUGHPUT TECHNOLOGIES AS DESCRIBED BY CMS-2020-01-R: HCPCS

## 2020-06-11 LAB — SARS-COV-2 RNA RESP QL NAA+PROBE: NOT DETECTED

## 2020-08-31 ENCOUNTER — PATIENT OUTREACH (OUTPATIENT)
Dept: ADMINISTRATIVE | Facility: OTHER | Age: 65
End: 2020-08-31

## 2020-08-31 NOTE — PROGRESS NOTES
Requested updates within Care Everywhere.  Patient's chart was reviewed for overdue RAINER topics.  Media reviewed for outside colon ca screening.  Immunizations reconciled.    Orders placed:n/a  Tasked appts:n/a  Labs Linked:n/a

## 2020-08-31 NOTE — PROGRESS NOTES
Cardiology Clinic Note  Reason for Visit: CAD    HPI:     Gerard L Schoen III is a 65 y.o. M, who presents for follow up of CAD s/p MI.     Over weekend, had pain in left arm. It felt muscular on outside of arm. He had some discomfort in chest, 3-4/10. He also felt discomfort in his jaw bilaterally. These are the same symptoms he had prior to his MI/arrest in 2017.  He reports having a poor diet recently.   He exercises 6x/week with treadmill and elliptical. No ischemic symptoms with exertion.  No symptoms of HF, arrhythmia, or claudication.    Medical: CAD s/p STEMI resulting in cardiac arrest with PCI to LCx (2017)  Family: sister  CHF at 48yo, both parents decreased from cancer, grandparents with MIs  Social: smoked >30y (<1 PPD, quit ; has not smoked since his MI), occasional EtOH, his wife is an RN at [employer] in the day of surgery center    [facility] 7/15/17  LAD - large vessel, wraps around apex, diffuse intimal disease, distal 60% stenosis  D1 - moderate to large size, prox 50% stenosis  LCx - totally occluded in mid vessel (s/p Resolute Integrity 2.75mm x 18mm stent)  RCA - large co-dominant vessel, prox 40% stenosis, mid 40% stenosis  LVEF 45%      ROS:    Constitution: Negative for fever or chills.  HENT: Negative for  headaches.  Eyes: Negative for blurred vision.   Cardiovascular: See above  Pulmonary: Negative for SOB. Negative for cough.   Gastrointestinal: Negative for nausea/vomiting.   : Negative for dysuria.   Skin: Negative for rashes.  Neurological: Negative for focal weakness.  Psychological: Negative for depression.    Medications:     Current Outpatient Medications on File Prior to Visit   Medication Sig Dispense Refill    aspirin (ECOTRIN) 81 MG EC tablet Take 81 mg by mouth once daily.  0    atorvastatin (LIPITOR) 40 MG tablet Take 1 tablet (40 mg total) by mouth once daily. 90 tablet 3    azithromycin (Z-RADHA) 250 MG tablet Take 2 tablets by mouth x 1 for day 1  "Then take 1 tablet by mouth daily for day 2 - 5 6 tablet 0    docusate sodium (COLACE) 100 MG capsule Take 100 mg by mouth once daily.      irbesartan (AVAPRO) 75 MG tablet TAKE 1 TABLET(75 MG) BY MOUTH EVERY DAY 90 tablet 3    Lactobac no.30/Bifidobact no.4 (ULTIMATE BRENT PROBIOTIC ORAL) Take 1 tablet by mouth 2 (two) times daily.      pantoprazole (PROTONIX) 40 MG tablet TAKE 1 TABLET BY MOUTH TWICE DAILY 180 tablet 2    sucralfate (CARAFATE) 1 gram tablet   9    [DISCONTINUED] irbesartan (AVAPRO) 75 MG tablet Take 1 tablet (75 mg total) by mouth once daily. 90 tablet 3     No current facility-administered medications on file prior to visit.        Physical Exam:   /70   Pulse 71   Ht 5' 11" (1.803 m)   Wt 88.3 kg (194 lb 10.7 oz)   SpO2 98%   BMI 27.15 kg/m²      Constitutional: No apparent distress, conversant  HEENT: Sclera anicteric, extraocular movements intact  Neck: No jugular venous distension, no carotid bruits  CV: Regular rate and rhythm, no murmurs rubs or gallops, normal S1/S2  Pulm: Clear to auscultation bilaterally  GI: Abdomen soft, no palpable masses  Extremities: No lower extremity edema, warm with palpable pulses  Skin: No ecchymosis, erythema, or ulcers  Psych: Alert and oriented to person place location, appropriate affect  Neuro: No focal deficits    Labs:     Blood Tests:  Lab Results   Component Value Date     02/20/2019    K 4.4 02/20/2019     02/20/2019    CO2 25 02/20/2019    BUN 24 (H) 02/20/2019    CREATININE 1.1 02/20/2019    GLU 95 02/20/2019    HGBA1C 5.6 02/20/2019    AST 26 02/20/2019    ALT 29 02/20/2019    ALBUMIN 3.8 02/20/2019    PROT 6.6 02/20/2019    BILITOT 0.5 02/20/2019    WBC 4.77 02/20/2019    HGB 14.0 02/20/2019    HCT 42.9 02/20/2019    MCV 97 02/20/2019     02/20/2019    PSA 1.2 02/20/2019    TSH 2.248 02/20/2019       Lab Results   Component Value Date    CHOL 134 02/20/2019    HDL 48 02/20/2019    TRIG 77 02/20/2019       Lab " Results   Component Value Date    LDLCALC 70.6 02/20/2019       Urine Tests:  Lab Results   Component Value Date    COLORU YELLOW 03/25/2004    APPEARANCEUA Hazy (A) 03/25/2004    PHUR 5.5 03/25/2004    SPECGRAV 1.018 03/25/2004    PROTEINUA NEG 03/25/2004    GLUCUA NEG 03/25/2004    KETONESU NEG 03/25/2004    BILIRUBINUA NEG 03/25/2004    OCCULTUA TR (A) 03/25/2004    NITRITE POS (A) 03/25/2004    UROBILINOGEN 0.2 03/25/2004    LEUKOCYTESUR TR (A) 03/25/2004       Imaging:     Echocardiogram  None    Stress testing  [facility] 9/27/17  PRE-TEST DATA   EKG: Resting electrocardiogram reveals normal sinus rhythm at a rate of 65 bpm.     TEST DESCRIPTION   The patient exercised for 8.08 minutes on a High Ramp protocol, corresponding to a functional capacity of 13 estimated METS, achieving a peak heart rate of 116 bpm, which is 73% of the age predicted maximum heart rate. The patient discontinued exercise   secondary to fatigue.     There were no significant electrocardiographic changes throughout the protocol suggesting ischemia.     EKG Conclusions:  1. The EKG portion of this study is negative for ischemia at a high workload, and peak heart rate of 116 bpm (73% of predicted).   2. Exercise capacity is average.   3. Blood pressure response to exercise was normal (Presenting BP: 106/71 Peak BP: 156/85).   4. The following arrhythmias were present: rare PVCs.   5. There were no symptoms of chest discomfort or significant dyspnea throughout the protocol.   6. The Duke treadmill score was 8 suggesting a low probability for future cardiovascular events.    CONCLUSIONS     1 - Normal left ventricular systolic function (EF 60-65%).     2 - No wall motion abnormalities.     3 - Normal left ventricular diastolic function.     4 - Normal right ventricular systolic function .     No evidence of stress induced myocardial ischemia.     Impression: NORMAL MYOCARDIAL PERFUSION  1. The perfusion scan is free of evidence for myocardial  ischemia or injury.   2. Resting wall motion is physiologic.   3. Resting LV function is normal.  (normal is >= 51%)  4. The ventricular volumes are normal at rest and stress.   5. The extracardiac distribution of radioactivity is normal.     SPECT 16  Impression: NORMAL MYOCARDIAL PERFUSION  1. The perfusion scan is free of evidence for myocardial ischemia or injury.   2. Resting wall motion is physiologic.   3. Resting LV function is normal.  (normal is >= 51%)  4. The ventricular volumes are normal at rest and stress.   5. The extracardiac distribution of radioactivity is normal.     Cath Lab  [facility] - [facility] 7/15/17  LAD - large vessel, wraps around apex, diffuse intimal disease, distal 60% stenosis  D1 - moderate to large size, prox 50% stenosis  LCx - totally occluded in mid vessel (s/p Resolute Integrity 2.75mm x 18mm stent)  RCA - large co-dominant vessel, prox 40% stenosis, mid 40% stenosis  LVEF 45%     Other  CPX 17  CPX Conclusions:  Normal functional status associated with a reduced breathing reserve, normal oxygen saturation, an excellent effort, and a normal AT. No clinically significant functional impairment. Possible sub-clinical ventilatory defect (see text above.)    EK17 - sinus rhythm with 1st degree AVB, LAD    Assessment:     1. Coronary artery disease of native artery of native heart with stable angina pectoris    2. S/P coronary angioplasty    3. History of MI (myocardial infarction)    4. Essential hypertension        Plan:     Coronary artery disease of native artery of native heart with stable angina pectoris  S/p PCI to LCx (2017)  LVEF decreased to 45% initially but had subsequent recovery with LVEF 60-65% on echo 2017.    Continue ASA 81mg daily indefinitely  Continue atorvastatin 40mg daily.  No BB due to resting bradycardia.    With recurrence of his prior anginal symptoms, will obtain PET stress (known complex CAD s/p prior PCI, prior reduced EF)    After stress,  regular aerobic exercise of 30-45 minutes/day, 5-7x/week.  Encouraged heart healthy diet.     Hypertension  BP at goal  Continue current meds    Hyperlipidemia  LDL at goal in 2/2019. Continue atorvastatin 40mg daily.  Check lipids/CMP    Signed:  Reza Goodman MD  Cardiology     9/1/2020 9:07 AM    Follow-up:     Future Appointments   Date Time Provider Department Center   9/1/2020  8:30 AM Salvador Goodman III, MD [facility]

## 2020-09-01 ENCOUNTER — OFFICE VISIT (OUTPATIENT)
Dept: CARDIOLOGY | Facility: CLINIC | Age: 65
End: 2020-09-01
Payer: COMMERCIAL

## 2020-09-01 ENCOUNTER — LAB VISIT (OUTPATIENT)
Dept: LAB | Facility: HOSPITAL | Age: 65
End: 2020-09-01
Attending: INTERNAL MEDICINE
Payer: COMMERCIAL

## 2020-09-01 VITALS
DIASTOLIC BLOOD PRESSURE: 70 MMHG | OXYGEN SATURATION: 98 % | BODY MASS INDEX: 27.26 KG/M2 | HEART RATE: 71 BPM | WEIGHT: 194.69 LBS | HEIGHT: 71 IN | SYSTOLIC BLOOD PRESSURE: 115 MMHG

## 2020-09-01 DIAGNOSIS — I25.2 HISTORY OF MI (MYOCARDIAL INFARCTION): ICD-10-CM

## 2020-09-01 DIAGNOSIS — I25.118 CORONARY ARTERY DISEASE OF NATIVE ARTERY OF NATIVE HEART WITH STABLE ANGINA PECTORIS: ICD-10-CM

## 2020-09-01 DIAGNOSIS — I10 ESSENTIAL HYPERTENSION: Chronic | ICD-10-CM

## 2020-09-01 DIAGNOSIS — I25.118 CORONARY ARTERY DISEASE OF NATIVE ARTERY OF NATIVE HEART WITH STABLE ANGINA PECTORIS: Primary | ICD-10-CM

## 2020-09-01 DIAGNOSIS — Z98.61 S/P CORONARY ANGIOPLASTY: ICD-10-CM

## 2020-09-01 LAB
ALBUMIN SERPL BCP-MCNC: 4.3 G/DL (ref 3.5–5.2)
ALP SERPL-CCNC: 88 U/L (ref 55–135)
ALT SERPL W/O P-5'-P-CCNC: 22 U/L (ref 10–44)
ANION GAP SERPL CALC-SCNC: 10 MMOL/L (ref 8–16)
AST SERPL-CCNC: 24 U/L (ref 10–40)
BASOPHILS # BLD AUTO: 0.02 K/UL (ref 0–0.2)
BASOPHILS NFR BLD: 0.4 % (ref 0–1.9)
BILIRUB SERPL-MCNC: 1 MG/DL (ref 0.1–1)
BUN SERPL-MCNC: 21 MG/DL (ref 8–23)
CALCIUM SERPL-MCNC: 9.5 MG/DL (ref 8.7–10.5)
CHLORIDE SERPL-SCNC: 107 MMOL/L (ref 95–110)
CHOLEST SERPL-MCNC: 131 MG/DL (ref 120–199)
CHOLEST/HDLC SERPL: 2.1 {RATIO} (ref 2–5)
CO2 SERPL-SCNC: 24 MMOL/L (ref 23–29)
CREAT SERPL-MCNC: 1.2 MG/DL (ref 0.5–1.4)
DIFFERENTIAL METHOD: ABNORMAL
EOSINOPHIL # BLD AUTO: 0.1 K/UL (ref 0–0.5)
EOSINOPHIL NFR BLD: 1.6 % (ref 0–8)
ERYTHROCYTE [DISTWIDTH] IN BLOOD BY AUTOMATED COUNT: 12.6 % (ref 11.5–14.5)
EST. GFR  (AFRICAN AMERICAN): >60 ML/MIN/1.73 M^2
EST. GFR  (NON AFRICAN AMERICAN): >60 ML/MIN/1.73 M^2
ESTIMATED AVG GLUCOSE: 114 MG/DL (ref 68–131)
GLUCOSE SERPL-MCNC: 99 MG/DL (ref 70–110)
HBA1C MFR BLD HPLC: 5.6 % (ref 4–5.6)
HCT VFR BLD AUTO: 46.5 % (ref 40–54)
HDLC SERPL-MCNC: 62 MG/DL (ref 40–75)
HDLC SERPL: 47.3 % (ref 20–50)
HGB BLD-MCNC: 14.8 G/DL (ref 14–18)
IMM GRANULOCYTES # BLD AUTO: 0.01 K/UL (ref 0–0.04)
IMM GRANULOCYTES NFR BLD AUTO: 0.2 % (ref 0–0.5)
LDLC SERPL CALC-MCNC: 50 MG/DL (ref 63–159)
LYMPHOCYTES # BLD AUTO: 1.5 K/UL (ref 1–4.8)
LYMPHOCYTES NFR BLD: 26.3 % (ref 18–48)
MCH RBC QN AUTO: 30.8 PG (ref 27–31)
MCHC RBC AUTO-ENTMCNC: 31.8 G/DL (ref 32–36)
MCV RBC AUTO: 97 FL (ref 82–98)
MONOCYTES # BLD AUTO: 0.5 K/UL (ref 0.3–1)
MONOCYTES NFR BLD: 9.7 % (ref 4–15)
NEUTROPHILS # BLD AUTO: 3.4 K/UL (ref 1.8–7.7)
NEUTROPHILS NFR BLD: 61.8 % (ref 38–73)
NONHDLC SERPL-MCNC: 69 MG/DL
NRBC BLD-RTO: 0 /100 WBC
PLATELET # BLD AUTO: 186 K/UL (ref 150–350)
PMV BLD AUTO: 9.9 FL (ref 9.2–12.9)
POTASSIUM SERPL-SCNC: 4.5 MMOL/L (ref 3.5–5.1)
PROT SERPL-MCNC: 7.2 G/DL (ref 6–8.4)
RBC # BLD AUTO: 4.8 M/UL (ref 4.6–6.2)
SODIUM SERPL-SCNC: 141 MMOL/L (ref 136–145)
TRIGL SERPL-MCNC: 95 MG/DL (ref 30–150)
WBC # BLD AUTO: 5.56 K/UL (ref 3.9–12.7)

## 2020-09-01 PROCEDURE — 3008F BODY MASS INDEX DOCD: CPT | Mod: CPTII,S$GLB,, | Performed by: INTERNAL MEDICINE

## 2020-09-01 PROCEDURE — 80053 COMPREHEN METABOLIC PANEL: CPT

## 2020-09-01 PROCEDURE — 3078F PR MOST RECENT DIASTOLIC BLOOD PRESSURE < 80 MM HG: ICD-10-PCS | Mod: CPTII,S$GLB,, | Performed by: INTERNAL MEDICINE

## 2020-09-01 PROCEDURE — 3074F PR MOST RECENT SYSTOLIC BLOOD PRESSURE < 130 MM HG: ICD-10-PCS | Mod: CPTII,S$GLB,, | Performed by: INTERNAL MEDICINE

## 2020-09-01 PROCEDURE — 85025 COMPLETE CBC W/AUTO DIFF WBC: CPT

## 2020-09-01 PROCEDURE — 99999 PR PBB SHADOW E&M-EST. PATIENT-LVL IV: CPT | Mod: PBBFAC,,, | Performed by: INTERNAL MEDICINE

## 2020-09-01 PROCEDURE — 99999 PR PBB SHADOW E&M-EST. PATIENT-LVL IV: ICD-10-PCS | Mod: PBBFAC,,, | Performed by: INTERNAL MEDICINE

## 2020-09-01 PROCEDURE — 3008F PR BODY MASS INDEX (BMI) DOCUMENTED: ICD-10-PCS | Mod: CPTII,S$GLB,, | Performed by: INTERNAL MEDICINE

## 2020-09-01 PROCEDURE — 83036 HEMOGLOBIN GLYCOSYLATED A1C: CPT

## 2020-09-01 PROCEDURE — 3074F SYST BP LT 130 MM HG: CPT | Mod: CPTII,S$GLB,, | Performed by: INTERNAL MEDICINE

## 2020-09-01 PROCEDURE — 99214 OFFICE O/P EST MOD 30 MIN: CPT | Mod: S$GLB,,, | Performed by: INTERNAL MEDICINE

## 2020-09-01 PROCEDURE — 80061 LIPID PANEL: CPT

## 2020-09-01 PROCEDURE — 99214 PR OFFICE/OUTPT VISIT, EST, LEVL IV, 30-39 MIN: ICD-10-PCS | Mod: S$GLB,,, | Performed by: INTERNAL MEDICINE

## 2020-09-01 PROCEDURE — 3078F DIAST BP <80 MM HG: CPT | Mod: CPTII,S$GLB,, | Performed by: INTERNAL MEDICINE

## 2020-09-01 PROCEDURE — 36415 COLL VENOUS BLD VENIPUNCTURE: CPT

## 2020-09-08 DIAGNOSIS — Z98.61 S/P CORONARY ANGIOPLASTY: Primary | ICD-10-CM

## 2020-09-16 ENCOUNTER — HOSPITAL ENCOUNTER (OUTPATIENT)
Dept: CARDIOLOGY | Facility: HOSPITAL | Age: 65
Discharge: HOME | End: 2020-09-16
Attending: INTERNAL MEDICINE
Payer: COMMERCIAL

## 2020-09-16 VITALS — HEIGHT: 71 IN | BODY MASS INDEX: 25.9 KG/M2 | WEIGHT: 185 LBS

## 2020-09-16 DIAGNOSIS — Z98.61 S/P CORONARY ANGIOPLASTY: ICD-10-CM

## 2020-09-16 LAB
ASCENDING AORTA: 3.62 CM
BSA FOR ECHO PROCEDURE: 2.05 M2
CV ECHO LV RWT: 0.35 CM
CV STRESS BASE HR: 62 BPM
DIASTOLIC BLOOD PRESSURE: 76 MMHG
DOP CALC LVOT AREA: 4.5 CM2
DOP CALC LVOT DIAMETER: 2.39 CM
DOP CALC LVOT PEAK VEL: 0.84 M/S
DOP CALC LVOT STROKE VOLUME: 76.14 CM3
DOP CALCLVOT PEAK VEL VTI: 16.98 CM
E WAVE DECELERATION TIME: 208.14 MSEC
E/A RATIO: 0.63
E/E' RATIO: 8.43 M/S
ECHO LV POSTERIOR WALL: 0.85 CM (ref 0.6–1.1)
FRACTIONAL SHORTENING: 29 % (ref 28–44)
INTERVENTRICULAR SEPTUM: 1.04 CM (ref 0.6–1.1)
IVRT: 74.22 MSEC
LA MAJOR: 5.88 CM
LA MINOR: 5.68 CM
LA WIDTH: 4.17 CM
LEFT ATRIUM SIZE: 3.7 CM
LEFT ATRIUM VOLUME INDEX: 37.1 ML/M2
LEFT ATRIUM VOLUME: 75.78 CM3
LEFT INTERNAL DIMENSION IN SYSTOLE: 3.43 CM (ref 2.1–4)
LEFT VENTRICLE DIASTOLIC VOLUME INDEX: 53.1 ML/M2
LEFT VENTRICLE DIASTOLIC VOLUME: 108.33 ML
LEFT VENTRICLE MASS INDEX: 78 G/M2
LEFT VENTRICLE SYSTOLIC VOLUME INDEX: 23.7 ML/M2
LEFT VENTRICLE SYSTOLIC VOLUME: 48.34 ML
LEFT VENTRICULAR INTERNAL DIMENSION IN DIASTOLE: 4.82 CM (ref 3.5–6)
LEFT VENTRICULAR MASS: 158.79 G
LV LATERAL E/E' RATIO: 8.43 M/S
LV SEPTAL E/E' RATIO: 8.43 M/S
MV PEAK A VEL: 0.93 M/S
MV PEAK E VEL: 0.59 M/S
MV STENOSIS PRESSURE HALF TIME: 60.36 MS
MV VALVE AREA P 1/2 METHOD: 3.64 CM2
OHS CV CPX 1 MINUTE RECOVERY HEART RATE: 104 BPM
OHS CV CPX 85 PERCENT MAX PREDICTED HEART RATE MALE: 133
OHS CV CPX ESTIMATED METS: 16
OHS CV CPX MAX PREDICTED HEART RATE: 156
OHS CV CPX PATIENT IS FEMALE: 0
OHS CV CPX PATIENT IS MALE: 1
OHS CV CPX PEAK DIASTOLIC BLOOD PRESSURE: 65 MMHG
OHS CV CPX PEAK HEAR RATE: 142 BPM
OHS CV CPX PEAK RATE PRESSURE PRODUCT: NORMAL
OHS CV CPX PEAK SYSTOLIC BLOOD PRESSURE: 163 MMHG
OHS CV CPX PERCENT MAX PREDICTED HEART RATE ACHIEVED: 91
OHS CV CPX RATE PRESSURE PRODUCT PRESENTING: 6758
PISA TR MAX VEL: 2.02 M/S
PULM VEIN S/D RATIO: 1.73
PV PEAK D VEL: 0.33 M/S
PV PEAK S VEL: 0.57 M/S
RA MAJOR: 5.29 CM
RA WIDTH: 4.17 CM
RIGHT VENTRICULAR END-DIASTOLIC DIMENSION: 3.78 CM
RV TISSUE DOPPLER FREE WALL SYSTOLIC VELOCITY 1 (APICAL 4 CHAMBER VIEW): 13.5 CM/S
SINUS: 3.31 CM
STJ: 2.66 CM
STRESS ECHO POST EXERCISE DUR MIN: 9 MINUTES
STRESS ECHO POST EXERCISE DUR SEC: 15 SECONDS
SYSTOLIC BLOOD PRESSURE: 109 MMHG
TDI LATERAL: 0.07 M/S
TDI SEPTAL: 0.07 M/S
TDI: 0.07 M/S
TR MAX PG: 16 MMHG
TRICUSPID ANNULAR PLANE SYSTOLIC EXCURSION: 2.21 CM

## 2020-09-16 PROCEDURE — 93351 STRESS TTE COMPLETE: CPT | Mod: 26,,, | Performed by: INTERNAL MEDICINE

## 2020-09-16 PROCEDURE — 93351 STRESS TTE COMPLETE: CPT

## 2020-09-16 PROCEDURE — 93351 STRESS ECHO (CUPID ONLY): ICD-10-PCS | Mod: 26,,, | Performed by: INTERNAL MEDICINE

## 2021-01-13 RX ORDER — PANTOPRAZOLE SODIUM 40 MG/1
40 TABLET, DELAYED RELEASE ORAL 2 TIMES DAILY
Qty: 180 TABLET | Refills: 1 | Status: SHIPPED | OUTPATIENT
Start: 2021-01-13 | End: 2021-08-15 | Stop reason: SDUPTHER

## 2021-03-18 ENCOUNTER — LAB VISIT (OUTPATIENT)
Dept: LAB | Facility: HOSPITAL | Age: 66
End: 2021-03-18
Attending: INTERNAL MEDICINE
Payer: COMMERCIAL

## 2021-03-18 ENCOUNTER — OFFICE VISIT (OUTPATIENT)
Dept: INTERNAL MEDICINE | Facility: CLINIC | Age: 66
End: 2021-03-18
Payer: COMMERCIAL

## 2021-03-18 VITALS
TEMPERATURE: 97 F | HEART RATE: 64 BPM | WEIGHT: 164 LBS | RESPIRATION RATE: 16 BRPM | BODY MASS INDEX: 22.96 KG/M2 | DIASTOLIC BLOOD PRESSURE: 64 MMHG | SYSTOLIC BLOOD PRESSURE: 122 MMHG | HEIGHT: 71 IN

## 2021-03-18 DIAGNOSIS — Z12.11 SPECIAL SCREENING FOR MALIGNANT NEOPLASMS, COLON: Primary | ICD-10-CM

## 2021-03-18 DIAGNOSIS — Z00.00 ANNUAL PHYSICAL EXAM: Primary | ICD-10-CM

## 2021-03-18 DIAGNOSIS — Z00.00 ANNUAL PHYSICAL EXAM: ICD-10-CM

## 2021-03-18 DIAGNOSIS — Z12.5 PROSTATE CANCER SCREENING: ICD-10-CM

## 2021-03-18 DIAGNOSIS — J30.2 SEASONAL ALLERGIES: ICD-10-CM

## 2021-03-18 DIAGNOSIS — R43.2 LOSS OF TASTE: ICD-10-CM

## 2021-03-18 DIAGNOSIS — Z86.16 HISTORY OF COVID-19: ICD-10-CM

## 2021-03-18 DIAGNOSIS — Z12.11 SCREEN FOR COLON CANCER: ICD-10-CM

## 2021-03-18 LAB
ALBUMIN SERPL BCP-MCNC: 4 G/DL (ref 3.5–5.2)
ALP SERPL-CCNC: 90 U/L (ref 55–135)
ALT SERPL W/O P-5'-P-CCNC: 18 U/L (ref 10–44)
ANION GAP SERPL CALC-SCNC: 7 MMOL/L (ref 8–16)
AST SERPL-CCNC: 21 U/L (ref 10–40)
BASOPHILS # BLD AUTO: 0.03 K/UL (ref 0–0.2)
BASOPHILS NFR BLD: 0.7 % (ref 0–1.9)
BILIRUB SERPL-MCNC: 0.6 MG/DL (ref 0.1–1)
BUN SERPL-MCNC: 26 MG/DL (ref 8–23)
CALCIUM SERPL-MCNC: 9 MG/DL (ref 8.7–10.5)
CHLORIDE SERPL-SCNC: 104 MMOL/L (ref 95–110)
CHOLEST SERPL-MCNC: 130 MG/DL (ref 120–199)
CHOLEST/HDLC SERPL: 1.9 {RATIO} (ref 2–5)
CO2 SERPL-SCNC: 29 MMOL/L (ref 23–29)
COMPLEXED PSA SERPL-MCNC: 1 NG/ML (ref 0–4)
CREAT SERPL-MCNC: 1.1 MG/DL (ref 0.5–1.4)
DIFFERENTIAL METHOD: ABNORMAL
EOSINOPHIL # BLD AUTO: 0.1 K/UL (ref 0–0.5)
EOSINOPHIL NFR BLD: 2.4 % (ref 0–8)
ERYTHROCYTE [DISTWIDTH] IN BLOOD BY AUTOMATED COUNT: 14 % (ref 11.5–14.5)
EST. GFR  (AFRICAN AMERICAN): >60 ML/MIN/1.73 M^2
EST. GFR  (NON AFRICAN AMERICAN): >60 ML/MIN/1.73 M^2
ESTIMATED AVG GLUCOSE: 105 MG/DL (ref 68–131)
GLUCOSE SERPL-MCNC: 95 MG/DL (ref 70–110)
HBA1C MFR BLD: 5.3 % (ref 4–5.6)
HCT VFR BLD AUTO: 41.9 % (ref 40–54)
HDLC SERPL-MCNC: 67 MG/DL (ref 40–75)
HDLC SERPL: 51.5 % (ref 20–50)
HGB BLD-MCNC: 13.5 G/DL (ref 14–18)
IMM GRANULOCYTES # BLD AUTO: 0.01 K/UL (ref 0–0.04)
IMM GRANULOCYTES NFR BLD AUTO: 0.2 % (ref 0–0.5)
LDLC SERPL CALC-MCNC: 54.4 MG/DL (ref 63–159)
LYMPHOCYTES # BLD AUTO: 1.5 K/UL (ref 1–4.8)
LYMPHOCYTES NFR BLD: 32.7 % (ref 18–48)
MCH RBC QN AUTO: 31.3 PG (ref 27–31)
MCHC RBC AUTO-ENTMCNC: 32.2 G/DL (ref 32–36)
MCV RBC AUTO: 97 FL (ref 82–98)
MONOCYTES # BLD AUTO: 0.5 K/UL (ref 0.3–1)
MONOCYTES NFR BLD: 11.5 % (ref 4–15)
NEUTROPHILS # BLD AUTO: 2.4 K/UL (ref 1.8–7.7)
NEUTROPHILS NFR BLD: 52.5 % (ref 38–73)
NONHDLC SERPL-MCNC: 63 MG/DL
NRBC BLD-RTO: 0 /100 WBC
PLATELET # BLD AUTO: 175 K/UL (ref 150–350)
PMV BLD AUTO: 11.6 FL (ref 9.2–12.9)
POTASSIUM SERPL-SCNC: 4.5 MMOL/L (ref 3.5–5.1)
PROT SERPL-MCNC: 6.8 G/DL (ref 6–8.4)
RBC # BLD AUTO: 4.31 M/UL (ref 4.6–6.2)
SODIUM SERPL-SCNC: 140 MMOL/L (ref 136–145)
TRIGL SERPL-MCNC: 43 MG/DL (ref 30–150)
TSH SERPL DL<=0.005 MIU/L-ACNC: 3.29 UIU/ML (ref 0.4–4)
WBC # BLD AUTO: 4.53 K/UL (ref 3.9–12.7)

## 2021-03-18 PROCEDURE — 36415 COLL VENOUS BLD VENIPUNCTURE: CPT | Mod: PO | Performed by: INTERNAL MEDICINE

## 2021-03-18 PROCEDURE — 99396 PR PREVENTIVE VISIT,EST,40-64: ICD-10-PCS | Mod: S$GLB,,, | Performed by: INTERNAL MEDICINE

## 2021-03-18 PROCEDURE — 3074F PR MOST RECENT SYSTOLIC BLOOD PRESSURE < 130 MM HG: ICD-10-PCS | Mod: CPTII,S$GLB,, | Performed by: INTERNAL MEDICINE

## 2021-03-18 PROCEDURE — 84153 ASSAY OF PSA TOTAL: CPT | Performed by: INTERNAL MEDICINE

## 2021-03-18 PROCEDURE — 3008F BODY MASS INDEX DOCD: CPT | Mod: CPTII,S$GLB,, | Performed by: INTERNAL MEDICINE

## 2021-03-18 PROCEDURE — 83036 HEMOGLOBIN GLYCOSYLATED A1C: CPT | Performed by: INTERNAL MEDICINE

## 2021-03-18 PROCEDURE — 3078F DIAST BP <80 MM HG: CPT | Mod: CPTII,S$GLB,, | Performed by: INTERNAL MEDICINE

## 2021-03-18 PROCEDURE — 80061 LIPID PANEL: CPT | Performed by: INTERNAL MEDICINE

## 2021-03-18 PROCEDURE — 84443 ASSAY THYROID STIM HORMONE: CPT | Performed by: INTERNAL MEDICINE

## 2021-03-18 PROCEDURE — 99999 PR PBB SHADOW E&M-EST. PATIENT-LVL III: ICD-10-PCS | Mod: PBBFAC,,, | Performed by: INTERNAL MEDICINE

## 2021-03-18 PROCEDURE — 3008F PR BODY MASS INDEX (BMI) DOCUMENTED: ICD-10-PCS | Mod: CPTII,S$GLB,, | Performed by: INTERNAL MEDICINE

## 2021-03-18 PROCEDURE — 3074F SYST BP LT 130 MM HG: CPT | Mod: CPTII,S$GLB,, | Performed by: INTERNAL MEDICINE

## 2021-03-18 PROCEDURE — 99999 PR PBB SHADOW E&M-EST. PATIENT-LVL III: CPT | Mod: PBBFAC,,, | Performed by: INTERNAL MEDICINE

## 2021-03-18 PROCEDURE — 85025 COMPLETE CBC W/AUTO DIFF WBC: CPT | Performed by: INTERNAL MEDICINE

## 2021-03-18 PROCEDURE — 3078F PR MOST RECENT DIASTOLIC BLOOD PRESSURE < 80 MM HG: ICD-10-PCS | Mod: CPTII,S$GLB,, | Performed by: INTERNAL MEDICINE

## 2021-03-18 PROCEDURE — 1126F AMNT PAIN NOTED NONE PRSNT: CPT | Mod: S$GLB,,, | Performed by: INTERNAL MEDICINE

## 2021-03-18 PROCEDURE — 99396 PREV VISIT EST AGE 40-64: CPT | Mod: S$GLB,,, | Performed by: INTERNAL MEDICINE

## 2021-03-18 PROCEDURE — 80053 COMPREHEN METABOLIC PANEL: CPT | Performed by: INTERNAL MEDICINE

## 2021-03-18 PROCEDURE — 1126F PR PAIN SEVERITY QUANTIFIED, NO PAIN PRESENT: ICD-10-PCS | Mod: S$GLB,,, | Performed by: INTERNAL MEDICINE

## 2021-03-18 RX ORDER — FLUTICASONE PROPIONATE 50 MCG
1 SPRAY, SUSPENSION (ML) NASAL DAILY
Qty: 16 G | Refills: 11 | Status: SHIPPED | OUTPATIENT
Start: 2021-03-18 | End: 2023-04-05

## 2021-03-18 RX ORDER — SODIUM, POTASSIUM,MAG SULFATES 17.5-3.13G
1 SOLUTION, RECONSTITUTED, ORAL ORAL DAILY
Qty: 1 KIT | Refills: 0 | Status: SHIPPED | OUTPATIENT
Start: 2021-03-18 | End: 2021-03-20

## 2021-03-18 RX ORDER — POLYETHYLENE GLYCOL 3350, SODIUM SULFATE ANHYDROUS, SODIUM BICARBONATE, SODIUM CHLORIDE, POTASSIUM CHLORIDE 236; 22.74; 6.74; 5.86; 2.97 G/4L; G/4L; G/4L; G/4L; G/4L
4 POWDER, FOR SOLUTION ORAL ONCE
Qty: 4000 ML | Refills: 0 | Status: SHIPPED | OUTPATIENT
Start: 2021-03-18 | End: 2021-03-18 | Stop reason: CLARIF

## 2021-03-18 RX ORDER — LEVOCETIRIZINE DIHYDROCHLORIDE 5 MG/1
5 TABLET, FILM COATED ORAL NIGHTLY
Qty: 30 TABLET | Refills: 11 | Status: SHIPPED | OUTPATIENT
Start: 2021-03-18 | End: 2022-04-04

## 2021-03-26 ENCOUNTER — PATIENT MESSAGE (OUTPATIENT)
Dept: RESEARCH | Facility: HOSPITAL | Age: 66
End: 2021-03-26

## 2021-04-18 ENCOUNTER — NURSE TRIAGE (OUTPATIENT)
Dept: ADMINISTRATIVE | Facility: CLINIC | Age: 66
End: 2021-04-18

## 2021-04-19 ENCOUNTER — HOSPITAL ENCOUNTER (OUTPATIENT)
Facility: HOSPITAL | Age: 66
Discharge: HOME | End: 2021-04-19
Attending: INTERNAL MEDICINE | Admitting: INTERNAL MEDICINE
Payer: COMMERCIAL

## 2021-04-19 ENCOUNTER — ANESTHESIA (OUTPATIENT)
Dept: ENDOSCOPY | Facility: HOSPITAL | Age: 66
End: 2021-04-19
Payer: COMMERCIAL

## 2021-04-19 ENCOUNTER — ANESTHESIA EVENT (OUTPATIENT)
Dept: ENDOSCOPY | Facility: HOSPITAL | Age: 66
End: 2021-04-19
Payer: COMMERCIAL

## 2021-04-19 VITALS
OXYGEN SATURATION: 100 % | HEART RATE: 48 BPM | WEIGHT: 162 LBS | SYSTOLIC BLOOD PRESSURE: 127 MMHG | RESPIRATION RATE: 14 BRPM | TEMPERATURE: 98 F | DIASTOLIC BLOOD PRESSURE: 74 MMHG | HEIGHT: 71 IN | BODY MASS INDEX: 22.68 KG/M2

## 2021-04-19 DIAGNOSIS — Z86.010 HISTORY OF COLON POLYPS: Primary | ICD-10-CM

## 2021-04-19 PROBLEM — Z86.0100 HISTORY OF COLON POLYPS: Status: ACTIVE | Noted: 2021-04-19

## 2021-04-19 PROCEDURE — G0105 COLORECTAL SCRN; HI RISK IND: HCPCS | Performed by: INTERNAL MEDICINE

## 2021-04-19 PROCEDURE — 37000008 HC ANESTHESIA 1ST 15 MINUTES: Performed by: INTERNAL MEDICINE

## 2021-04-19 PROCEDURE — 63600175 PHARM REV CODE 636 W HCPCS: Performed by: NURSE ANESTHETIST, CERTIFIED REGISTERED

## 2021-04-19 PROCEDURE — E9220 PRA ENDO ANESTHESIA: HCPCS | Mod: ,,, | Performed by: NURSE ANESTHETIST, CERTIFIED REGISTERED

## 2021-04-19 PROCEDURE — E9220 PRA ENDO ANESTHESIA: ICD-10-PCS | Mod: ,,, | Performed by: NURSE ANESTHETIST, CERTIFIED REGISTERED

## 2021-04-19 PROCEDURE — G0105 COLORECTAL SCRN; HI RISK IND: HCPCS | Mod: ,,, | Performed by: INTERNAL MEDICINE

## 2021-04-19 PROCEDURE — G0105 COLORECTAL SCRN; HI RISK IND: ICD-10-PCS | Mod: ,,, | Performed by: INTERNAL MEDICINE

## 2021-04-19 PROCEDURE — 37000009 HC ANESTHESIA EA ADD 15 MINS: Performed by: INTERNAL MEDICINE

## 2021-04-19 PROCEDURE — 25000003 PHARM REV CODE 250: Performed by: INTERNAL MEDICINE

## 2021-04-19 PROCEDURE — 25000003 PHARM REV CODE 250: Performed by: NURSE ANESTHETIST, CERTIFIED REGISTERED

## 2021-04-19 RX ORDER — SODIUM CHLORIDE 9 MG/ML
INJECTION, SOLUTION INTRAVENOUS CONTINUOUS
Status: DISCONTINUED | OUTPATIENT
Start: 2021-04-19 | End: 2021-04-19 | Stop reason: HOSPADM

## 2021-04-19 RX ORDER — PROPOFOL 10 MG/ML
VIAL (ML) INTRAVENOUS CONTINUOUS PRN
Status: DISCONTINUED | OUTPATIENT
Start: 2021-04-19 | End: 2021-04-19

## 2021-04-19 RX ORDER — LIDOCAINE HYDROCHLORIDE 20 MG/ML
INJECTION INTRAVENOUS
Status: DISCONTINUED | OUTPATIENT
Start: 2021-04-19 | End: 2021-04-19

## 2021-04-19 RX ORDER — PROPOFOL 10 MG/ML
VIAL (ML) INTRAVENOUS
Status: DISCONTINUED | OUTPATIENT
Start: 2021-04-19 | End: 2021-04-19

## 2021-04-19 RX ADMIN — PROPOFOL 70 MG: 10 INJECTION, EMULSION INTRAVENOUS at 08:04

## 2021-04-19 RX ADMIN — PROPOFOL 200 MCG/KG/MIN: 10 INJECTION, EMULSION INTRAVENOUS at 08:04

## 2021-04-19 RX ADMIN — SODIUM CHLORIDE: 0.9 INJECTION, SOLUTION INTRAVENOUS at 08:04

## 2021-04-19 RX ADMIN — LIDOCAINE HYDROCHLORIDE 75 MG: 20 INJECTION, SOLUTION INTRAVENOUS at 08:04

## 2021-08-14 ENCOUNTER — PATIENT MESSAGE (OUTPATIENT)
Dept: INTERNAL MEDICINE | Facility: CLINIC | Age: 66
End: 2021-08-14

## 2021-08-14 ENCOUNTER — PATIENT MESSAGE (OUTPATIENT)
Dept: CARDIOLOGY | Facility: CLINIC | Age: 66
End: 2021-08-14

## 2021-08-15 ENCOUNTER — PATIENT MESSAGE (OUTPATIENT)
Dept: INTERNAL MEDICINE | Facility: CLINIC | Age: 66
End: 2021-08-15

## 2021-08-15 RX ORDER — PANTOPRAZOLE SODIUM 40 MG/1
40 TABLET, DELAYED RELEASE ORAL 2 TIMES DAILY
Qty: 180 TABLET | Refills: 1 | Status: SHIPPED | OUTPATIENT
Start: 2021-08-15 | End: 2021-12-23

## 2021-08-15 RX ORDER — PANTOPRAZOLE SODIUM 40 MG/1
40 TABLET, DELAYED RELEASE ORAL 2 TIMES DAILY
Qty: 28 TABLET | Refills: 0 | Status: SHIPPED | OUTPATIENT
Start: 2021-08-15 | End: 2021-08-29

## 2021-08-16 RX ORDER — IRBESARTAN 75 MG/1
75 TABLET ORAL DAILY
Qty: 90 TABLET | Refills: 3 | Status: SHIPPED | OUTPATIENT
Start: 2021-08-16 | End: 2022-07-30 | Stop reason: SDUPTHER

## 2022-03-15 RX ORDER — PANTOPRAZOLE SODIUM 40 MG/1
TABLET, DELAYED RELEASE ORAL
Qty: 30 TABLET | Refills: 0 | Status: SHIPPED | OUTPATIENT
Start: 2022-03-15 | End: 2022-04-04 | Stop reason: SDUPTHER

## 2022-04-04 ENCOUNTER — LAB VISIT (OUTPATIENT)
Dept: LAB | Facility: HOSPITAL | Age: 67
End: 2022-04-04
Attending: INTERNAL MEDICINE
Payer: COMMERCIAL

## 2022-04-04 ENCOUNTER — OFFICE VISIT (OUTPATIENT)
Dept: INTERNAL MEDICINE | Facility: CLINIC | Age: 67
End: 2022-04-04
Payer: COMMERCIAL

## 2022-04-04 VITALS
HEIGHT: 71 IN | RESPIRATION RATE: 10 BRPM | BODY MASS INDEX: 25.89 KG/M2 | HEART RATE: 65 BPM | WEIGHT: 184.94 LBS | SYSTOLIC BLOOD PRESSURE: 100 MMHG | DIASTOLIC BLOOD PRESSURE: 60 MMHG

## 2022-04-04 DIAGNOSIS — Z11.4 ENCOUNTER FOR SCREENING FOR HIV: ICD-10-CM

## 2022-04-04 DIAGNOSIS — I25.118 CORONARY ARTERY DISEASE OF NATIVE ARTERY OF NATIVE HEART WITH STABLE ANGINA PECTORIS: Chronic | ICD-10-CM

## 2022-04-04 DIAGNOSIS — Z12.5 PROSTATE CANCER SCREENING: ICD-10-CM

## 2022-04-04 DIAGNOSIS — Z00.00 ANNUAL PHYSICAL EXAM: Primary | ICD-10-CM

## 2022-04-04 DIAGNOSIS — Z87.891 HISTORY OF TOBACCO ABUSE: ICD-10-CM

## 2022-04-04 DIAGNOSIS — R41.89 COGNITIVE DEFICITS: ICD-10-CM

## 2022-04-04 DIAGNOSIS — Z11.59 NEED FOR HEPATITIS C SCREENING TEST: ICD-10-CM

## 2022-04-04 DIAGNOSIS — I10 ESSENTIAL HYPERTENSION: Chronic | ICD-10-CM

## 2022-04-04 DIAGNOSIS — Z00.00 ANNUAL PHYSICAL EXAM: ICD-10-CM

## 2022-04-04 LAB
ALBUMIN SERPL BCP-MCNC: 3.9 G/DL (ref 3.5–5.2)
ALP SERPL-CCNC: 99 U/L (ref 55–135)
ALT SERPL W/O P-5'-P-CCNC: 20 U/L (ref 10–44)
ANION GAP SERPL CALC-SCNC: 9 MMOL/L (ref 8–16)
AST SERPL-CCNC: 22 U/L (ref 10–40)
BASOPHILS # BLD AUTO: 0.03 K/UL (ref 0–0.2)
BASOPHILS NFR BLD: 0.8 % (ref 0–1.9)
BILIRUB SERPL-MCNC: 0.6 MG/DL (ref 0.1–1)
BUN SERPL-MCNC: 24 MG/DL (ref 8–23)
CALCIUM SERPL-MCNC: 9.8 MG/DL (ref 8.7–10.5)
CHLORIDE SERPL-SCNC: 107 MMOL/L (ref 95–110)
CHOLEST SERPL-MCNC: 196 MG/DL (ref 120–199)
CHOLEST/HDLC SERPL: 2.8 {RATIO} (ref 2–5)
CO2 SERPL-SCNC: 28 MMOL/L (ref 23–29)
COMPLEXED PSA SERPL-MCNC: 0.89 NG/ML (ref 0–4)
CREAT SERPL-MCNC: 1.1 MG/DL (ref 0.5–1.4)
DIFFERENTIAL METHOD: ABNORMAL
EOSINOPHIL # BLD AUTO: 0.1 K/UL (ref 0–0.5)
EOSINOPHIL NFR BLD: 1.4 % (ref 0–8)
ERYTHROCYTE [DISTWIDTH] IN BLOOD BY AUTOMATED COUNT: 13.2 % (ref 11.5–14.5)
EST. GFR  (AFRICAN AMERICAN): >60 ML/MIN/1.73 M^2
EST. GFR  (NON AFRICAN AMERICAN): >60 ML/MIN/1.73 M^2
ESTIMATED AVG GLUCOSE: 105 MG/DL (ref 68–131)
GLUCOSE SERPL-MCNC: 67 MG/DL (ref 70–110)
HBA1C MFR BLD: 5.3 % (ref 4–5.6)
HCT VFR BLD AUTO: 44.8 % (ref 40–54)
HCV AB SERPL QL IA: NEGATIVE
HDLC SERPL-MCNC: 70 MG/DL (ref 40–75)
HDLC SERPL: 35.7 % (ref 20–50)
HGB BLD-MCNC: 14.4 G/DL (ref 14–18)
HIV 1+2 AB+HIV1 P24 AG SERPL QL IA: NEGATIVE
IMM GRANULOCYTES # BLD AUTO: 0.01 K/UL (ref 0–0.04)
IMM GRANULOCYTES NFR BLD AUTO: 0.3 % (ref 0–0.5)
LDLC SERPL CALC-MCNC: 108.6 MG/DL (ref 63–159)
LYMPHOCYTES # BLD AUTO: 1.1 K/UL (ref 1–4.8)
LYMPHOCYTES NFR BLD: 30.1 % (ref 18–48)
MCH RBC QN AUTO: 31.9 PG (ref 27–31)
MCHC RBC AUTO-ENTMCNC: 32.1 G/DL (ref 32–36)
MCV RBC AUTO: 99 FL (ref 82–98)
MONOCYTES # BLD AUTO: 0.5 K/UL (ref 0.3–1)
MONOCYTES NFR BLD: 13 % (ref 4–15)
NEUTROPHILS # BLD AUTO: 1.9 K/UL (ref 1.8–7.7)
NEUTROPHILS NFR BLD: 54.4 % (ref 38–73)
NONHDLC SERPL-MCNC: 126 MG/DL
NRBC BLD-RTO: 0 /100 WBC
PLATELET # BLD AUTO: 187 K/UL (ref 150–450)
PMV BLD AUTO: 11 FL (ref 9.2–12.9)
POTASSIUM SERPL-SCNC: 5.2 MMOL/L (ref 3.5–5.1)
PROT SERPL-MCNC: 6.9 G/DL (ref 6–8.4)
RBC # BLD AUTO: 4.52 M/UL (ref 4.6–6.2)
SODIUM SERPL-SCNC: 144 MMOL/L (ref 136–145)
TRIGL SERPL-MCNC: 87 MG/DL (ref 30–150)
TSH SERPL DL<=0.005 MIU/L-ACNC: 2.83 UIU/ML (ref 0.4–4)
WBC # BLD AUTO: 3.55 K/UL (ref 3.9–12.7)

## 2022-04-04 PROCEDURE — 90471 PNEUMOCOCCAL POLYSACCHARIDE VACCINE 23-VALENT =>2YO SQ IM: ICD-10-PCS | Mod: S$GLB,,, | Performed by: INTERNAL MEDICINE

## 2022-04-04 PROCEDURE — 3074F PR MOST RECENT SYSTOLIC BLOOD PRESSURE < 130 MM HG: ICD-10-PCS | Mod: CPTII,S$GLB,, | Performed by: INTERNAL MEDICINE

## 2022-04-04 PROCEDURE — 90732 PPSV23 VACC 2 YRS+ SUBQ/IM: CPT | Mod: S$GLB,,, | Performed by: INTERNAL MEDICINE

## 2022-04-04 PROCEDURE — 4010F PR ACE/ARB THEARPY RXD/TAKEN: ICD-10-PCS | Mod: CPTII,S$GLB,, | Performed by: INTERNAL MEDICINE

## 2022-04-04 PROCEDURE — 3078F DIAST BP <80 MM HG: CPT | Mod: CPTII,S$GLB,, | Performed by: INTERNAL MEDICINE

## 2022-04-04 PROCEDURE — 1159F MED LIST DOCD IN RCRD: CPT | Mod: CPTII,S$GLB,, | Performed by: INTERNAL MEDICINE

## 2022-04-04 PROCEDURE — 84443 ASSAY THYROID STIM HORMONE: CPT | Performed by: INTERNAL MEDICINE

## 2022-04-04 PROCEDURE — 90732 PNEUMOCOCCAL POLYSACCHARIDE VACCINE 23-VALENT =>2YO SQ IM: ICD-10-PCS | Mod: S$GLB,,, | Performed by: INTERNAL MEDICINE

## 2022-04-04 PROCEDURE — 3078F PR MOST RECENT DIASTOLIC BLOOD PRESSURE < 80 MM HG: ICD-10-PCS | Mod: CPTII,S$GLB,, | Performed by: INTERNAL MEDICINE

## 2022-04-04 PROCEDURE — 90694 FLU VACCINE - QUADRIVALENT - ADJUVANTED: ICD-10-PCS | Mod: S$GLB,,, | Performed by: INTERNAL MEDICINE

## 2022-04-04 PROCEDURE — 3074F SYST BP LT 130 MM HG: CPT | Mod: CPTII,S$GLB,, | Performed by: INTERNAL MEDICINE

## 2022-04-04 PROCEDURE — 99999 PR PBB SHADOW E&M-EST. PATIENT-LVL III: ICD-10-PCS | Mod: PBBFAC,,, | Performed by: INTERNAL MEDICINE

## 2022-04-04 PROCEDURE — 99999 PR PBB SHADOW E&M-EST. PATIENT-LVL III: CPT | Mod: PBBFAC,,, | Performed by: INTERNAL MEDICINE

## 2022-04-04 PROCEDURE — 90694 VACC AIIV4 NO PRSRV 0.5ML IM: CPT | Mod: S$GLB,,, | Performed by: INTERNAL MEDICINE

## 2022-04-04 PROCEDURE — 84153 ASSAY OF PSA TOTAL: CPT | Performed by: INTERNAL MEDICINE

## 2022-04-04 PROCEDURE — 87389 HIV-1 AG W/HIV-1&-2 AB AG IA: CPT | Performed by: INTERNAL MEDICINE

## 2022-04-04 PROCEDURE — 90472 IMMUNIZATION ADMIN EACH ADD: CPT | Mod: S$GLB,,, | Performed by: INTERNAL MEDICINE

## 2022-04-04 PROCEDURE — 86803 HEPATITIS C AB TEST: CPT | Performed by: INTERNAL MEDICINE

## 2022-04-04 PROCEDURE — 90472 FLU VACCINE - QUADRIVALENT - ADJUVANTED: ICD-10-PCS | Mod: S$GLB,,, | Performed by: INTERNAL MEDICINE

## 2022-04-04 PROCEDURE — 90471 IMMUNIZATION ADMIN: CPT | Mod: S$GLB,,, | Performed by: INTERNAL MEDICINE

## 2022-04-04 PROCEDURE — 4010F ACE/ARB THERAPY RXD/TAKEN: CPT | Mod: CPTII,S$GLB,, | Performed by: INTERNAL MEDICINE

## 2022-04-04 PROCEDURE — 99397 PER PM REEVAL EST PAT 65+ YR: CPT | Mod: 25,S$GLB,, | Performed by: INTERNAL MEDICINE

## 2022-04-04 PROCEDURE — 99397 PR PREVENTIVE VISIT,EST,65 & OVER: ICD-10-PCS | Mod: 25,S$GLB,, | Performed by: INTERNAL MEDICINE

## 2022-04-04 PROCEDURE — 1160F RVW MEDS BY RX/DR IN RCRD: CPT | Mod: CPTII,S$GLB,, | Performed by: INTERNAL MEDICINE

## 2022-04-04 PROCEDURE — 80061 LIPID PANEL: CPT | Performed by: INTERNAL MEDICINE

## 2022-04-04 PROCEDURE — 36415 COLL VENOUS BLD VENIPUNCTURE: CPT | Mod: PO | Performed by: INTERNAL MEDICINE

## 2022-04-04 PROCEDURE — 80053 COMPREHEN METABOLIC PANEL: CPT | Performed by: INTERNAL MEDICINE

## 2022-04-04 PROCEDURE — 83036 HEMOGLOBIN GLYCOSYLATED A1C: CPT | Performed by: INTERNAL MEDICINE

## 2022-04-04 PROCEDURE — 1159F PR MEDICATION LIST DOCUMENTED IN MEDICAL RECORD: ICD-10-PCS | Mod: CPTII,S$GLB,, | Performed by: INTERNAL MEDICINE

## 2022-04-04 PROCEDURE — 3008F BODY MASS INDEX DOCD: CPT | Mod: CPTII,S$GLB,, | Performed by: INTERNAL MEDICINE

## 2022-04-04 PROCEDURE — 3008F PR BODY MASS INDEX (BMI) DOCUMENTED: ICD-10-PCS | Mod: CPTII,S$GLB,, | Performed by: INTERNAL MEDICINE

## 2022-04-04 PROCEDURE — 85025 COMPLETE CBC W/AUTO DIFF WBC: CPT | Performed by: INTERNAL MEDICINE

## 2022-04-04 PROCEDURE — 1160F PR REVIEW ALL MEDS BY PRESCRIBER/CLIN PHARMACIST DOCUMENTED: ICD-10-PCS | Mod: CPTII,S$GLB,, | Performed by: INTERNAL MEDICINE

## 2022-04-04 RX ORDER — PANTOPRAZOLE SODIUM 40 MG/1
40 TABLET, DELAYED RELEASE ORAL 2 TIMES DAILY
Qty: 180 TABLET | Refills: 3 | Status: SHIPPED | OUTPATIENT
Start: 2022-04-04 | End: 2023-04-05 | Stop reason: SDUPTHER

## 2022-04-04 RX ORDER — ATORVASTATIN CALCIUM 40 MG/1
40 TABLET, FILM COATED ORAL DAILY
Qty: 90 TABLET | Refills: 3 | Status: SHIPPED | OUTPATIENT
Start: 2022-04-04 | End: 2023-02-15 | Stop reason: SDUPTHER

## 2022-04-04 NOTE — PROGRESS NOTES
Subjective:       Patient ID: Gerard L Schoen III is a 66 y.o. male.    Chief Complaint: Annual Exam    HPI     66 y.o. male here for annual exam.     Cholesterol: needs  Vaccines: Influenza - needs; Tetanus - 2019; Pneumovax - needs; Prevnar - needs; Zoster - done; COVID - 2 done  Sexual Screening:   STD screening: no concern  Eye exam: done last about a year ago  Prostate: needs  Colonoscopy: done , due   A1c: needs    Exercise: 6-7 days a week.  Cardio.  Once in a while does some light weights.  Diet: combination of fast food.  Does proscribed meals type thing.  He orders a meal delivery service (healthy course).    He has two perscriptions that are .    He has trouble sleeping, because he is urinating all night (once).  He gets about 5 hours of sleep at night.    He has noticed that his mental abilities are worse at work.  He has trouble formulating sentences.100/    Past Medical History:   Diagnosis Date    Colon polyp 2015    Coronary artery disease involving native coronary artery of native heart without angina pectoris 2017    Essential hypertension 2019    GERD (gastroesophageal reflux disease)      Past Surgical History:   Procedure Laterality Date    COLONOSCOPY      diverticulosis    COLONOSCOPY N/A 2021    Procedure: COLONOSCOPY;  Surgeon: Shant Navarro MD;  Location: [address]);  Service: Endoscopy;  Laterality: N/A;  COVID + on 20 - prep ins. emailed - ERW     Social History     Socioeconomic History    Marital status:      Spouse name: Andria    Number of children: 1   Occupational History    Occupation:      Employer: [employer]     Comment: [place]    Tobacco Use    Smoking status: Former Smoker     Packs/day: 0.00     Years: 0.00     Pack years: 0.00     Types: Cigarettes     Quit date: 2006     Years since quittin.2    Smokeless tobacco: Former User    Tobacco comment: No smoking in  last 10 years   Substance and Sexual Activity    Alcohol use: No    Drug use: No    Sexual activity: Yes     Partners: Female     Comment:      Social Determinants of Health     Financial Resource Strain: Low Risk     Difficulty of Paying Living Expenses: Not very hard   Food Insecurity: No Food Insecurity    Worried About Running Out of Food in the Last Year: Never true    Ran Out of Food in the Last Year: Never true   Transportation Needs: No Transportation Needs    Lack of Transportation (Medical): No    Lack of Transportation (Non-Medical): No   Physical Activity: Unknown    Days of Exercise per Week: 6 days   Stress: Stress Concern Present    Feeling of Stress : Very much   Social Connections: Unknown    Frequency of Communication with Friends and Family: More than three times a week    Frequency of Social Gatherings with Friends and Family: More than three times a week    Active Member of Clubs or Organizations: No    Attends Club or Organization Meetings: 1 to 4 times per year    Marital Status:    Housing Stability: Low Risk     Unable to Pay for Housing in the Last Year: No    Number of Places Lived in the Last Year: 1    Unstable Housing in the Last Year: No     Review of patient's allergies indicates:  No Known Allergies  Gerard Jerry Schoen L. Schoen III had no medications administered during this visit.    Review of Systems   Constitutional: Negative for activity change and unexpected weight change.   HENT: Positive for hearing loss. Negative for trouble swallowing.    Eyes: Positive for visual disturbance. Negative for discharge.   Respiratory: Negative for chest tightness and wheezing.    Cardiovascular: Negative for chest pain and palpitations.   Gastrointestinal: Negative for blood in stool, constipation, diarrhea and vomiting.   Endocrine: Positive for polyuria. Negative for polydipsia.   Genitourinary: Positive for urgency. Negative for difficulty urinating and  hematuria.   Musculoskeletal: Negative for arthralgias, joint swelling and neck pain.   Neurological: Negative for weakness and headaches.   Psychiatric/Behavioral: Positive for confusion. Negative for dysphoric mood.         Objective:      Physical Exam  Vitals reviewed.   Constitutional:       Appearance: He is well-developed.   HENT:      Head: Normocephalic and atraumatic.      Mouth/Throat:      Pharynx: No oropharyngeal exudate.   Eyes:      General: No scleral icterus.        Right eye: No discharge.         Left eye: No discharge.      Pupils: Pupils are equal, round, and reactive to light.   Neck:      Thyroid: No thyromegaly.      Trachea: No tracheal deviation.   Cardiovascular:      Rate and Rhythm: Normal rate and regular rhythm.      Heart sounds: Normal heart sounds. No murmur heard.    No friction rub. No gallop.   Pulmonary:      Effort: Pulmonary effort is normal. No respiratory distress.      Breath sounds: Normal breath sounds. No wheezing or rales.   Chest:      Chest wall: No tenderness.   Abdominal:      General: Bowel sounds are normal. There is no distension.      Palpations: Abdomen is soft. There is no mass.      Tenderness: There is no abdominal tenderness. There is no guarding or rebound.   Musculoskeletal:         General: No tenderness. Normal range of motion.      Cervical back: Normal range of motion and neck supple.   Skin:     General: Skin is warm and dry.      Coloration: Skin is not pale.      Findings: No erythema or rash.   Neurological:      Mental Status: He is alert and oriented to person, place, and time.   Psychiatric:         Behavior: Behavior normal.         Assessment:       1. Annual physical exam  - CBC Auto Differential; Future  - Comprehensive Metabolic Panel; Future  - TSH; Future  - Lipid Panel; Future  - Hemoglobin A1C; Future    2. Essential hypertension    3. Coronary artery disease of native artery of native heart with stable angina pectoris    4. Prostate  cancer screening  - PSA, Screening; Future    5. Need for hepatitis C screening test  - Hepatitis C Antibody; Future    6. Encounter for screening for HIV  - HIV 1/2 Ag/Ab (4th Gen); Future    7. History of tobacco abuse  - CV AAA Screening; Future    8. Cognitive deficits  - Ambulatory referral/consult to Neurology; Future      Plan:       1.  Check CBC, CMP, TSH, lipids, A1c.  Discussed diet and exercise.  2. Continue irbesartan 75 mg.  3. Continue aspirin 81 mg, Lipitor 40 mg.  4. Check PSA.  5. Check hep C.  6. Check HIV.  7. Check abdominal aortic aneurysm screen.  8. Refer to Neurology.

## 2022-04-14 ENCOUNTER — HOSPITAL ENCOUNTER (OUTPATIENT)
Dept: CARDIOLOGY | Facility: HOSPITAL | Age: 67
Discharge: HOME | End: 2022-04-14
Attending: INTERNAL MEDICINE
Payer: COMMERCIAL

## 2022-04-14 DIAGNOSIS — I10 ESSENTIAL HYPERTENSION: ICD-10-CM

## 2022-04-14 DIAGNOSIS — I10 ESSENTIAL HYPERTENSION: Primary | ICD-10-CM

## 2022-04-14 DIAGNOSIS — Z87.891 HISTORY OF TOBACCO ABUSE: ICD-10-CM

## 2022-04-14 LAB
ABDOMINAL IMA AP: 1.74 CM
ABDOMINAL IMA ED VEL: 16 CM/S
ABDOMINAL IMA PS VEL: 96 CM/S
ABDOMINAL IMA TRANS: 1.74 CM
ABDOMINAL INFRARENAL AORTA AP: 1.94 CM
ABDOMINAL INFRARENAL AORTA ED VEL: 28 CM/S
ABDOMINAL INFRARENAL AORTA PS VEL: 126 CM/S
ABDOMINAL INFRARENAL AORTA TRANS: 1.94 CM
ABDOMINAL JUXTARENAL AORTA AP: 1.84 CM
ABDOMINAL JUXTARENAL AORTA ED VEL: 10 CM/S
ABDOMINAL JUXTARENAL AORTA PS VEL: 141 CM/S
ABDOMINAL JUXTARENAL AORTA TRANS: 1.82 CM
ABDOMINAL LT COM ILIAC AP: 1.15 CM
ABDOMINAL LT COM ILIAC TRANS: 1.15 CM
ABDOMINAL LT COM ILIAC VEL: 141 CM/S
ABDOMINAL LT COM ILLIAC ED VEL: 26 CM/S
ABDOMINAL RT COM ILIAC AP: 1.11 CM
ABDOMINAL RT COM ILIAC TRANS: 1.11 CM
ABDOMINAL RT COM ILIAC VEL: 88 CM/S
ABDOMINAL RT COM ILLIAC ED VEL: 14 CM/S
ABDOMINAL SUPRARENAL AORTA AP: 1.6 CM
ABDOMINAL SUPRARENAL AORTA ED VEL: 12 CM/S
ABDOMINAL SUPRARENAL AORTA PS VEL: 94 CM/S
ABDOMINAL SUPRARENAL AORTA TRANS: 1.58 CM

## 2022-04-14 PROCEDURE — 93978 CV US ABDOMINAL AORTA EVALUATION (CUPID ONLY): ICD-10-PCS | Mod: 26,,, | Performed by: INTERNAL MEDICINE

## 2022-04-14 PROCEDURE — 93978 VASCULAR STUDY: CPT

## 2022-04-14 PROCEDURE — 93978 VASCULAR STUDY: CPT | Mod: 26,,, | Performed by: INTERNAL MEDICINE

## 2022-04-27 ENCOUNTER — LAB VISIT (OUTPATIENT)
Dept: LAB | Facility: HOSPITAL | Age: 67
End: 2022-04-27
Attending: NURSE PRACTITIONER
Payer: COMMERCIAL

## 2022-04-27 ENCOUNTER — OFFICE VISIT (OUTPATIENT)
Dept: NEUROLOGY | Facility: CLINIC | Age: 67
End: 2022-04-27
Payer: COMMERCIAL

## 2022-04-27 ENCOUNTER — TELEPHONE (OUTPATIENT)
Dept: NEUROLOGY | Facility: CLINIC | Age: 67
End: 2022-04-27

## 2022-04-27 VITALS
HEART RATE: 61 BPM | BODY MASS INDEX: 26.02 KG/M2 | SYSTOLIC BLOOD PRESSURE: 97 MMHG | HEIGHT: 71 IN | RESPIRATION RATE: 16 BRPM | DIASTOLIC BLOOD PRESSURE: 64 MMHG | WEIGHT: 185.88 LBS

## 2022-04-27 DIAGNOSIS — R41.3 MEMORY LOSS: ICD-10-CM

## 2022-04-27 DIAGNOSIS — Z98.61 S/P CORONARY ANGIOPLASTY: ICD-10-CM

## 2022-04-27 DIAGNOSIS — R41.3 OTHER AMNESIA: ICD-10-CM

## 2022-04-27 DIAGNOSIS — R41.89 COGNITIVE DEFICITS: ICD-10-CM

## 2022-04-27 DIAGNOSIS — I25.118 CORONARY ARTERY DISEASE OF NATIVE ARTERY OF NATIVE HEART WITH STABLE ANGINA PECTORIS: Chronic | ICD-10-CM

## 2022-04-27 DIAGNOSIS — Z86.16 HISTORY OF COVID-19: ICD-10-CM

## 2022-04-27 DIAGNOSIS — I10 ESSENTIAL HYPERTENSION: Chronic | ICD-10-CM

## 2022-04-27 DIAGNOSIS — R41.3 MEMORY LOSS: Primary | ICD-10-CM

## 2022-04-27 DIAGNOSIS — R43.2 LOSS OF TASTE: ICD-10-CM

## 2022-04-27 DIAGNOSIS — G47.9 SLEEP DIFFICULTIES: ICD-10-CM

## 2022-04-27 PROCEDURE — 3078F DIAST BP <80 MM HG: CPT | Mod: CPTII,S$GLB,, | Performed by: NURSE PRACTITIONER

## 2022-04-27 PROCEDURE — 4010F PR ACE/ARB THEARPY RXD/TAKEN: ICD-10-PCS | Mod: CPTII,S$GLB,, | Performed by: NURSE PRACTITIONER

## 2022-04-27 PROCEDURE — 3074F SYST BP LT 130 MM HG: CPT | Mod: CPTII,S$GLB,, | Performed by: NURSE PRACTITIONER

## 2022-04-27 PROCEDURE — 4010F ACE/ARB THERAPY RXD/TAKEN: CPT | Mod: CPTII,S$GLB,, | Performed by: NURSE PRACTITIONER

## 2022-04-27 PROCEDURE — 1126F AMNT PAIN NOTED NONE PRSNT: CPT | Mod: CPTII,S$GLB,, | Performed by: NURSE PRACTITIONER

## 2022-04-27 PROCEDURE — 82746 ASSAY OF FOLIC ACID SERUM: CPT | Performed by: NURSE PRACTITIONER

## 2022-04-27 PROCEDURE — 3044F HG A1C LEVEL LT 7.0%: CPT | Mod: CPTII,S$GLB,, | Performed by: NURSE PRACTITIONER

## 2022-04-27 PROCEDURE — 3008F PR BODY MASS INDEX (BMI) DOCUMENTED: ICD-10-PCS | Mod: CPTII,S$GLB,, | Performed by: NURSE PRACTITIONER

## 2022-04-27 PROCEDURE — 83921 ORGANIC ACID SINGLE QUANT: CPT | Performed by: NURSE PRACTITIONER

## 2022-04-27 PROCEDURE — 82607 VITAMIN B-12: CPT | Performed by: NURSE PRACTITIONER

## 2022-04-27 PROCEDURE — 99205 PR OFFICE/OUTPT VISIT, NEW, LEVL V, 60-74 MIN: ICD-10-PCS | Mod: S$GLB,,, | Performed by: NURSE PRACTITIONER

## 2022-04-27 PROCEDURE — 84425 ASSAY OF VITAMIN B-1: CPT | Performed by: NURSE PRACTITIONER

## 2022-04-27 PROCEDURE — 3288F PR FALLS RISK ASSESSMENT DOCUMENTED: ICD-10-PCS | Mod: CPTII,S$GLB,, | Performed by: NURSE PRACTITIONER

## 2022-04-27 PROCEDURE — 3074F PR MOST RECENT SYSTOLIC BLOOD PRESSURE < 130 MM HG: ICD-10-PCS | Mod: CPTII,S$GLB,, | Performed by: NURSE PRACTITIONER

## 2022-04-27 PROCEDURE — 99999 PR PBB SHADOW E&M-EST. PATIENT-LVL V: CPT | Mod: PBBFAC,,, | Performed by: NURSE PRACTITIONER

## 2022-04-27 PROCEDURE — 3288F FALL RISK ASSESSMENT DOCD: CPT | Mod: CPTII,S$GLB,, | Performed by: NURSE PRACTITIONER

## 2022-04-27 PROCEDURE — 1101F PR PT FALLS ASSESS DOC 0-1 FALLS W/OUT INJ PAST YR: ICD-10-PCS | Mod: CPTII,S$GLB,, | Performed by: NURSE PRACTITIONER

## 2022-04-27 PROCEDURE — 3044F PR MOST RECENT HEMOGLOBIN A1C LEVEL <7.0%: ICD-10-PCS | Mod: CPTII,S$GLB,, | Performed by: NURSE PRACTITIONER

## 2022-04-27 PROCEDURE — 1159F MED LIST DOCD IN RCRD: CPT | Mod: CPTII,S$GLB,, | Performed by: NURSE PRACTITIONER

## 2022-04-27 PROCEDURE — 99205 OFFICE O/P NEW HI 60 MIN: CPT | Mod: S$GLB,,, | Performed by: NURSE PRACTITIONER

## 2022-04-27 PROCEDURE — 1101F PT FALLS ASSESS-DOCD LE1/YR: CPT | Mod: CPTII,S$GLB,, | Performed by: NURSE PRACTITIONER

## 2022-04-27 PROCEDURE — 1159F PR MEDICATION LIST DOCUMENTED IN MEDICAL RECORD: ICD-10-PCS | Mod: CPTII,S$GLB,, | Performed by: NURSE PRACTITIONER

## 2022-04-27 PROCEDURE — 3008F BODY MASS INDEX DOCD: CPT | Mod: CPTII,S$GLB,, | Performed by: NURSE PRACTITIONER

## 2022-04-27 PROCEDURE — 3078F PR MOST RECENT DIASTOLIC BLOOD PRESSURE < 80 MM HG: ICD-10-PCS | Mod: CPTII,S$GLB,, | Performed by: NURSE PRACTITIONER

## 2022-04-27 PROCEDURE — 86592 SYPHILIS TEST NON-TREP QUAL: CPT | Performed by: NURSE PRACTITIONER

## 2022-04-27 PROCEDURE — 99999 PR PBB SHADOW E&M-EST. PATIENT-LVL V: ICD-10-PCS | Mod: PBBFAC,,, | Performed by: NURSE PRACTITIONER

## 2022-04-27 PROCEDURE — 1126F PR PAIN SEVERITY QUANTIFIED, NO PAIN PRESENT: ICD-10-PCS | Mod: CPTII,S$GLB,, | Performed by: NURSE PRACTITIONER

## 2022-04-27 NOTE — PROGRESS NOTES
NEUROLOGY  Outpatient Consultation Visit     [facility]  [address] [facility], [place] [address]  [phone] (office) / [phone] (fax)    Patient Name:  Gerard L Schoen III  :  1955  MR #:  [id number]  Acct #:  [id number]    Date of  Visit: 2022    Other Physicians:  Ata Montana MD (Primary Care Physician)      CHIEF COMPLAINT: Memory Loss      HISTORY OF PRESENT ILLNESS:  Gerard L Schoen III is a 66 y.o. R-handed male seen in consultation for memory loss per Ata Montana MD    Medical history is significant for CAD w/stent, HLD, HTN    He is the  for [employer]. He has 2 years of college education.     He is alone today for this visit.     He notes that he has had a bad memory for most of his life. He has a good LT memory, mainly ST difficulty. He took courses in the past through [employer] to help with this in the business realm. He has also had trouble focusing throughout his life, even as a young student. He notes difficulty formulating his thoughts and sentences. He can't remember phone numbers and forgets names. He asks repetitive questions. Independent with ADLs and iADLs. He manages household affairs, plans vacations. No issues performing his job functions, but wonders if he gets away with things at work because he has been there so long and its a family business. No issues driving / navigating. He questions some executive dysfunction when he has to manage complicated events. No change in vision or comprehension.     No physical concerns or gait changes. He golfs and works out in home gym. Eats a fairly restricted diet, was doing [employer] up until recently.     Having issues with nocturia. Has trouble falling back to sleep once he wakes up. This has been ongoing for years. No issues falling asleep initially, but can't relax his brain once he wakes to use the restroom. Not aware of any snoring. Doesn't feel overly tired during  the day, but known to doze off in the chair if he is watching TV. Has not been sleep tested. Tends to have HA upon waking at least weekly.     He denies significant anxiety or depression on a daily basis.     He got COVID19 in March 2020. He noted worsening of his concentration issues and memory loss afterwards but seems to have stabilized back to baseline. He still can't smell or taste certain things.     He doesn't drink any ETOH since he had a MI in 2017, no history of ETOH abuse. He smoked when he was younger, but quit many years ago.     Father wasn't diagnosed with dementia, but did have a lot of issues with memory and focus.     Allergies:  Review of patient's allergies indicates:  No Known Allergies    Current Medications:  Current Outpatient Medications   Medication Sig Dispense Refill    aspirin (ECOTRIN) 81 MG EC tablet Take 81 mg by mouth once daily.  0    atorvastatin (LIPITOR) 40 MG tablet Take 1 tablet (40 mg total) by mouth once daily. 90 tablet 3    fluticasone propionate (FLONASE) 50 mcg/actuation nasal spray 1 spray (50 mcg total) by Each Nostril route once daily. 16 g 11    irbesartan (AVAPRO) 75 MG tablet Take 1 tablet (75 mg total) by mouth once daily. 90 tablet 3    Lactobac no.30/Bifidobact no.4 (ULTIMATE BRENT PROBIOTIC ORAL) Take 1 tablet by mouth 2 (two) times daily.      pantoprazole (PROTONIX) 40 MG tablet Take 1 tablet (40 mg total) by mouth 2 (two) times daily. 180 tablet 3    sucralfate (CARAFATE) 1 gram tablet   9     No current facility-administered medications for this visit.       Past Medical History:  Past Medical History:   Diagnosis Date    Colon polyp 08/04/2015    Coronary artery disease involving native coronary artery of native heart without angina pectoris 8/1/2017    Essential hypertension 1/11/2019    GERD (gastroesophageal reflux disease)        Past Surgical History:  Past Surgical History:   Procedure Laterality Date    COLONOSCOPY  2008    diverticulosis  "   COLONOSCOPY N/A 4/19/2021    Procedure: COLONOSCOPY;  Surgeon: Shant Navarro MD;  Location: [facility] ([address] [place] [address]);  Service: Endoscopy;  Laterality: N/A;  COVID + on 4/28/20 - prep ins. emailed - [facility]       Family History:  family history includes Cancer in his father, maternal grandfather, mother, and sister; Coronary artery disease in his paternal grandfather; Hearing loss in his father; Heart disease in his paternal grandfather.    Social History:   reports that he quit smoking about 16 years ago. His smoking use included cigarettes. He smoked 0.00 packs per day for 0.00 years. He has quit using smokeless tobacco. He reports that he does not drink alcohol and does not use drugs.      REVIEW OF SYSTEMS:  As per HPI    PHYSICAL EXAM:  BP 97/64 (BP Location: Right arm, Patient Position: Sitting, BP Method: Medium (Automatic))   Pulse 61   Resp 16   Ht 5' 11" (1.803 m)   Wt 84.3 kg (185 lb 13.6 oz)   BMI 25.92 kg/m²     General: Well groomed. No acute distress.  Cardiovascular: Regular rate and rhythm.    Pulmonary: Normal effort and rate.   Musculoskeletal: No obvious joint deformities, moves all extremities well.  Extremities: No clubbing, cyanosis or edema.     Neurological exam:  Mental status: Awake and alert.  Oriented to person, place, time and situation. Recent and remote memory seem intact.  Fund of knowledge normal. Slight decrease in attention and concentration.   Speech/Language: Overall fluent and appropriate. Mild WFD at times, but also degree of pressured speech at times. Able to follow complex commands.   Cranial nerves (II-XII): Visual fields full. Pupils equal round and reactive to light, extraocular movements intact, no ptosis, no nystagmus. Facial sensation and symmetry intact bilaterally. Hearing grossly intact. Palate mobile and midline. Shoulder shrug normal bilaterally. Normal tongue protrusion.   Motor: 5 out of 5 strength throughout the upper and lower extremities bilaterally. " Normal bulk and tone. No abnormal movements noted. No drift appreciated.   Sensation: Intact to light touch and temperature.   DTR: 2+ at the knees and biceps bilaterally.  Coordination: Finger-nose-finger testing intact bilaterally. INES normal bilaterally. No tremor.    Gait: Normal gait.     DIAGNOSTIC DATA:  I have personally reviewed provider notes, labs and imaging made available to me today.     Imaging:  N/a    Labs:  CBC:   Lab Results   Component Value Date    WBC 3.55 (L) 04/04/2022    HGB 14.4 04/04/2022    HCT 44.8 04/04/2022     04/04/2022    MCV 99 (H) 04/04/2022    RDW 13.2 04/04/2022     BMP:   Lab Results   Component Value Date     04/04/2022    K 5.2 (H) 04/04/2022     04/04/2022    CO2 28 04/04/2022    BUN 24 (H) 04/04/2022    CREATININE 1.1 04/04/2022    GLU 67 (L) 04/04/2022    CALCIUM 9.8 04/04/2022     LFTS;   Lab Results   Component Value Date    PROT 6.9 04/04/2022    ALBUMIN 3.9 04/04/2022    BILITOT 0.6 04/04/2022    AST 22 04/04/2022    ALKPHOS 99 04/04/2022    ALT 20 04/04/2022     COAGS: No results found for: INR, PROTIME, PTT  FLP:   Lab Results   Component Value Date    CHOL 196 04/04/2022    HDL 70 04/04/2022    LDLCALC 108.6 04/04/2022    TRIG 87 04/04/2022    CHOLHDL 35.7 04/04/2022     Component      Latest Ref Rng & Units 4/4/2022   Hemoglobin A1C External      4.0 - 5.6 % 5.3   Estimated Avg Glucose      68 - 131 mg/dL 105   TSH      0.400 - 4.000 uIU/mL 2.830   HIV 1/2 Ag/Ab      Negative Negative       ASSESSMENT & PLAN:  Gerard L Schoen III is a 66 y.o. R-handed male seen in consultation for memory loss.     Problem List Items Addressed This Visit        Neuro    Loss of taste    Current Assessment & Plan     Post COVID19            Memory loss - Primary    Overview     MMSE 27/30 April 2022           Current Assessment & Plan     Despite technically normal cognitive score today, will evaluate further with baseline MRI brain, serologies and NP testing given  his age / symptoms. Relatively low concern for neurodegenerative etiology as compared to a pseudo pattern, but possible. Will also proceed with HST to evaluate for possible JASON contributing to his poor sleep and cognitive symptoms.     Reviewed vascular RF: CAD, HTN, HLD -- all of which are being appropriately managed.     No indication for Rx today. Reviewed nutritional recommendations for brain health, Med diet etc.     No safety concerns today               Cardiac/Vascular    Coronary artery disease of native artery of native heart with stable angina pectoris (Chronic)    Essential hypertension (Chronic)    S/P coronary angioplasty       ID    History of COVID-19       Other    Sleep difficulties      Other Visit Diagnoses     Cognitive deficits        Other amnesia              Follow up: ~ 3 months with me, after testing completed. Ok for virtual     I spent a total of 60 minutes on the day of the visit.    This includes face to face time with the patient, as well as non-face to face time preparing for and completing the visit (review of prior diagnostic testing and clinical notes, obtaining or reviewing history, documenting clinical information in the EMR, independently interpreting and communicating results to the patient/family and coordinating ongoing care).       I appreciate the opportunity to participate in the care of this patient. Please feel free to contact me with any concerns or questions.       Citlalli Reyes, ACNPC-AG  [facility]  [address] [place] [address]

## 2022-04-27 NOTE — PATIENT INSTRUCTIONS
While your cognitive testing was technically normal, I would like to get some more in depth testing given your symptoms and age.     We will obtain some basic labs to rule out any reversible causes for your memory loss, such as a nutritional deficiency or a thyroid problem. We will also obtain some baseline neuroimaging to look at the brain structure itself. I have also referred you for a more in-depth cognitive assessment with our neuropyschologist. Please be aware that this process may take a few months to complete.     Certain medications have been shown to slow the clinical progression of MCI in some people. It is important to understand that these medications cannot reverse any pre-existing process. I do not think that you warrant any medication at this time.     It is very important to work diligently to keep the blood vessels healthy to prevent any worsening. You should continue to work closely with your PCP to control your blood pressure, cholesterol and blood sugar. The [place]iter[place] diet is also recommended to promote overall vascular health.     We will also obtain a home sleep study to evaluate for possible sleep apnea given some of your symtpoms. Poor sleep can cause cognitive changes, like trouble focusing and retaining information.     We can do a virtual follow up in about 3 months / after you have had the testing done.     While memory loss may not be reversible in many cases, we do know that there are several steps that you can take to prevent further memory loss:  Diet:  A Mediterranean style diet has been shown to be beneficial. This diet typically includes fresh fruits/vegetables, whole grains, fish and poultry. Foods, such as red meat, dairy and processed foods are avoided.   Research indicates certain nutrients may aid in brain function, such as B vitamins (especially B6, B12, and folic acid), antioxidants (such as vitamins C and E, and beta carotene), and Omega-3 fatty acids.  Minimize or  eliminate the use of alcohol     Medications:  Discuss any prescription or over the counter medications you might be taking with your doctor to avoid those that can cause sedation or worsen cognitive function, such as sleep aids, benzodiazepines, and antihistamines.     Socialization and Exercise:  30-45 minutes of brisk physical activity 5 days/week has been shown to improve function in vascular dementias, lower the risk of stroke and slow the progression of memory loss  Activities that are engaging or mentally stimulating, such as word puzzles, jigsaw puzzles and Sudoku, are also beneficial to cognitive health  Regular interaction with friends, family and community are also known to be helpful.     Sleep:  Establish a regular, consistent sleep pattern and practice good sleep hygiene.    Avoid screen time (computer, TV, smartphones or tablets) or heavy meals for at least an hour before bedtime.   Avoid caffeine or stimulants after 2 PM.   Exercise earlier in the day or mornings and keep your sleeping environment comfortable. Bedtime and wake-up times should be consistent every night and morning so the body becomes used to a single routine, even on the weekends.   Having a wind down routine (e.g., soft lights in the house, bath before bed, reduced fluid intake, songs, reading, less noise) also helps to promote sleep readiness.   Untreated obstructive sleep apnea can lead to an increased risk for stroke and further memory loss      STRATEGIES TO COPE WITH YOUR COGNITIVE DEFICITS:  Pay Attention!  Reduce distractions in the area  Look at the person speaking to you & paraphrase what they are saying  Write down important things  Ask them to repeat themselves if you zone out  Ask people to simplify or reduce information if you need to  Processing Speed  Using multiple methods to learn new information, such as listening, taking notes, and recording, can be helpful  Give yourself enough time to complete certain tasks to  reduce frustration  Executive Functioning  Don't try to multi-task. Do tasks separately to ensure that each gets completed.   Use a calendar or planner to keep you on track  Write down steps to complicated tasks in case you forget  Storing Information  Immediately after you learn something, try to recall it. Repeat this and gradually lengthen the interval between your recalls  Recalling information   Jog your memory! If you loose something, try to think back to when you last had it. Mentally walk yourself through the steps of your day to prod your memory.   Use clues, timers, memos, notes, etc.  Stay organized - keep your keys in a certain place, put your important papers in a certain place, etc.   Having a routine helps to anchor memories as well

## 2022-04-27 NOTE — ASSESSMENT & PLAN NOTE
Despite technically normal cognitive score today, will evaluate further with baseline MRI brain, serologies and NP testing given his age / symptoms. Relatively low concern for neurodegenerative etiology as compared to a pseudo pattern, but possible. Will also proceed with HST to evaluate for possible JASON contributing to his poor sleep and cognitive symptoms.     Reviewed vascular RF: CAD, HTN, HLD -- all of which are being appropriately managed.     No indication for Rx today. Reviewed nutritional recommendations for brain health, Med diet etc.     No safety concerns today

## 2022-04-28 LAB
FOLATE SERPL-MCNC: 10.5 NG/ML (ref 4–24)
RPR SER QL: NORMAL
VIT B12 SERPL-MCNC: 335 PG/ML (ref 210–950)

## 2022-05-03 LAB
METHYLMALONATE SERPL-SCNC: 0.18 UMOL/L
VIT B1 BLD-MCNC: 73 UG/L (ref 38–122)

## 2022-05-18 ENCOUNTER — HOSPITAL ENCOUNTER (OUTPATIENT)
Dept: RADIOLOGY | Facility: HOSPITAL | Age: 67
Discharge: HOME | End: 2022-05-18
Attending: NURSE PRACTITIONER
Payer: COMMERCIAL

## 2022-05-18 DIAGNOSIS — R41.3 OTHER AMNESIA: ICD-10-CM

## 2022-05-18 PROCEDURE — 70551 MRI BRAIN WITHOUT CONTRAST: ICD-10-PCS | Mod: 26,,, | Performed by: RADIOLOGY

## 2022-05-18 PROCEDURE — 70551 MRI BRAIN STEM W/O DYE: CPT | Mod: 26,,, | Performed by: RADIOLOGY

## 2022-05-18 PROCEDURE — 70551 MRI BRAIN STEM W/O DYE: CPT | Mod: TC,PO

## 2022-07-05 RX ORDER — IRBESARTAN 75 MG/1
75 TABLET ORAL DAILY
Qty: 90 TABLET | Refills: 3 | OUTPATIENT
Start: 2022-07-05

## 2022-07-07 ENCOUNTER — PATIENT MESSAGE (OUTPATIENT)
Dept: INTERNAL MEDICINE | Facility: CLINIC | Age: 67
End: 2022-07-07
Payer: COMMERCIAL

## 2022-07-07 RX ORDER — SUCRALFATE 1 G/1
1 TABLET ORAL 2 TIMES DAILY
Qty: 60 TABLET | Refills: 9 | Status: SHIPPED | OUTPATIENT
Start: 2022-07-07 | End: 2023-07-03 | Stop reason: SDUPTHER

## 2022-07-12 ENCOUNTER — OFFICE VISIT (OUTPATIENT)
Dept: NEUROLOGY | Facility: CLINIC | Age: 67
End: 2022-07-12
Payer: COMMERCIAL

## 2022-07-12 DIAGNOSIS — I25.118 CORONARY ARTERY DISEASE OF NATIVE ARTERY OF NATIVE HEART WITH STABLE ANGINA PECTORIS: Chronic | ICD-10-CM

## 2022-07-12 DIAGNOSIS — R41.3 MEMORY LOSS: Primary | ICD-10-CM

## 2022-07-12 DIAGNOSIS — G47.9 SLEEP DIFFICULTIES: ICD-10-CM

## 2022-07-12 DIAGNOSIS — I10 ESSENTIAL HYPERTENSION: Chronic | ICD-10-CM

## 2022-07-12 PROCEDURE — 99213 OFFICE O/P EST LOW 20 MIN: CPT | Mod: 95,,, | Performed by: NURSE PRACTITIONER

## 2022-07-12 PROCEDURE — 1160F RVW MEDS BY RX/DR IN RCRD: CPT | Mod: CPTII,95,, | Performed by: NURSE PRACTITIONER

## 2022-07-12 PROCEDURE — 1159F PR MEDICATION LIST DOCUMENTED IN MEDICAL RECORD: ICD-10-PCS | Mod: CPTII,95,, | Performed by: NURSE PRACTITIONER

## 2022-07-12 PROCEDURE — 1160F PR REVIEW ALL MEDS BY PRESCRIBER/CLIN PHARMACIST DOCUMENTED: ICD-10-PCS | Mod: CPTII,95,, | Performed by: NURSE PRACTITIONER

## 2022-07-12 PROCEDURE — 3044F HG A1C LEVEL LT 7.0%: CPT | Mod: CPTII,95,, | Performed by: NURSE PRACTITIONER

## 2022-07-12 PROCEDURE — 4010F ACE/ARB THERAPY RXD/TAKEN: CPT | Mod: CPTII,95,, | Performed by: NURSE PRACTITIONER

## 2022-07-12 PROCEDURE — 4010F PR ACE/ARB THEARPY RXD/TAKEN: ICD-10-PCS | Mod: CPTII,95,, | Performed by: NURSE PRACTITIONER

## 2022-07-12 PROCEDURE — 1159F MED LIST DOCD IN RCRD: CPT | Mod: CPTII,95,, | Performed by: NURSE PRACTITIONER

## 2022-07-12 PROCEDURE — 3044F PR MOST RECENT HEMOGLOBIN A1C LEVEL <7.0%: ICD-10-PCS | Mod: CPTII,95,, | Performed by: NURSE PRACTITIONER

## 2022-07-12 PROCEDURE — 99213 PR OFFICE/OUTPT VISIT, EST, LEVL III, 20-29 MIN: ICD-10-PCS | Mod: 95,,, | Performed by: NURSE PRACTITIONER

## 2022-07-12 NOTE — PATIENT INSTRUCTIONS
We will follow up on the previously ordered sleep study to evaluate for a possible underlying sleep disorder, like sleep apnea. Please let me know if you are not contacted to schedule this within the next week or so.     I ordered the more in depth cognitive testing (neuropsychological testing) after your initial visit. The department is currently about 3-4 months out with regards to scheduling this testing due to high demand. They should be contacting you in the next month or so to set this up if you are still interested in pursuing this. This can be done on the [facility] or on the [facility].     Continue efforts to maximize your vascular health -- controlling blood pressure, blood sugar, healthy lifestyle, etc. This promotes cognition and overall brain health.     I will see you back in 6 months to 1 year for a follow up assessment. Please contact me sooner for any questions or concerns!

## 2022-07-12 NOTE — PROGRESS NOTES
NEUROLOGY  Outpatient Consultation Visit     [facility]  [address] [facility], [place] [address]  [phone] (office) / [phone] (fax)    Patient Name:  Gerard L Schoen III  :  1955  MR #:  [id number]  Acct #:  [id number]    Date of  Visit: 2022    Other Physicians:  Ata Montana MD (Primary Care Physician)      The patient location is: [place]   The chief complaint leading to consultation is: memory loss     Visit type: audio only  Audiovisual visit was attempted, but due to technical difficulties, converted to audio only in the interest of time     Each patient to whom he or she provides medical services by telemedicine is:  (1) informed of the relationship between the physician and patient and the respective role of any other health care provider with respect to management of the patient; and (2) notified that he or she may decline to receive medical services by telemedicine and may withdraw from such care at any time.    Interval history:  22:  Gerard L Schoen III is a 66 y.o. R-handed male seen in follow up for memory loss     Medical history is significant for CAD w/stent, HLD, HTN    He didn't have the sleep study. He hasn't been contacted to set this up. He notes that his sleep patterns have actually improved because he has limited his fluid intake before bed. He is feeling better rested because he isn't waking as much.     He feels that his memory is stable since our last visit. States that his wife agrees and also hasn't expressed much concern.     He hasn't been contacted for NP testing yet, as expected.     The MRI brain showed mild age related changes. The serologies were unremarkable.     He denies any significant changes to his medical history or medications since our last visit.     HISTORY OF PRESENT ILLNESS 22:  Gerard L Schoen III is a 66 y.o. R-handed male seen in consultation for memory loss per Self, Aaareferral    Medical history is significant  for CAD w/stent, HLD, HTN    He is the  for [employer]. He has 2 years of college education.     He is alone today for this visit.     He notes that he has had a bad memory for most of his life. He has a good LT memory, mainly ST difficulty. He took courses in the past through [employer] to help with this in the business realm. He has also had trouble focusing throughout his life, even as a young student. He notes difficulty formulating his thoughts and sentences. He can't remember phone numbers and forgets names. He asks repetitive questions. Independent with ADLs and iADLs. He manages household affairs, plans vacations. No issues performing his job functions, but wonders if he gets away with things at work because he has been there so long and its a family business. No issues driving / navigating. He questions some executive dysfunction when he has to manage complicated events. No change in vision or comprehension.     No physical concerns or gait changes. He golfs and works out in home gym. Eats a fairly restricted diet, was doing [employer] up until recently.     Having issues with nocturia. Has trouble falling back to sleep once he wakes up. This has been ongoing for years. No issues falling asleep initially, but can't relax his brain once he wakes to use the restroom. Not aware of any snoring. Doesn't feel overly tired during the day, but known to doze off in the chair if he is watching TV. Has not been sleep tested. Tends to have HA upon waking at least weekly.     He denies significant anxiety or depression on a daily basis.     He got COVID19 in 2020. He noted worsening of his concentration issues and memory loss afterwards but seems to have stabilized back to baseline. He still can't smell or taste certain things.     He doesn't drink any ETOH since he had a MI in 2017, no history of ETOH abuse. He smoked when he was younger, but quit many years ago.      Father wasn't diagnosed with dementia, but did have a lot of issues with memory and focus.     Allergies:  Review of patient's allergies indicates:  No Known Allergies    Current Medications:  Current Outpatient Medications   Medication Sig Dispense Refill    atorvastatin (LIPITOR) 40 MG tablet Take 1 tablet (40 mg total) by mouth once daily. 90 tablet 3    irbesartan (AVAPRO) 75 MG tablet Take 1 tablet (75 mg total) by mouth once daily. 90 tablet 3    Lactobac no.30/Bifidobact no.4 (ULTIMATE BRENT PROBIOTIC ORAL) Take 1 tablet by mouth 2 (two) times daily.      pantoprazole (PROTONIX) 40 MG tablet Take 1 tablet (40 mg total) by mouth 2 (two) times daily. 180 tablet 3    sucralfate (CARAFATE) 1 gram tablet Take 1 tablet (1 g total) by mouth 2 (two) times daily. 60 tablet 9    aspirin (ECOTRIN) 81 MG EC tablet Take 81 mg by mouth once daily.  0    fluticasone propionate (FLONASE) 50 mcg/actuation nasal spray 1 spray (50 mcg total) by Each Nostril route once daily. 16 g 11     No current facility-administered medications for this visit.       Past Medical History:  Past Medical History:   Diagnosis Date    Colon polyp 08/04/2015    Coronary artery disease involving native coronary artery of native heart without angina pectoris 8/1/2017    Essential hypertension 1/11/2019    GERD (gastroesophageal reflux disease)        Past Surgical History:  Past Surgical History:   Procedure Laterality Date    COLONOSCOPY  2008    diverticulosis    COLONOSCOPY N/A 4/19/2021    Procedure: COLONOSCOPY;  Surgeon: Shant Navarro MD;  Location: [address];  Service: Endoscopy;  Laterality: N/A;  COVID + on 4/28/20 - prep ins. emailed - [facility]       Family History:  family history includes Cancer in his father, maternal grandfather, mother, and sister; Coronary artery disease in his paternal grandfather; Hearing loss in his father; Heart disease in his paternal grandfather.    Social History:   reports that he  quit smoking about 16 years ago. His smoking use included cigarettes. He smoked 0.00 packs per day for 0.00 years. He has quit using smokeless tobacco. He reports that he does not drink alcohol and does not use drugs.      REVIEW OF SYSTEMS:  As per HPI    PHYSICAL EXAM:  There were no vitals taken for this visit.    Limited exam during audio visit     Neurological exam:  Mental status: Awake and alert.  Oriented to person, place, time and situation. Recent and remote memory seem intact.  Fund of knowledge normal.   Speech/Language: Overall fluent and appropriate.       DIAGNOSTIC DATA:  I have personally reviewed provider notes, labs and imaging made available to me today.     Imaging:  MRI brain wo 5/18/22:  FINDINGS:  Intracranial contents:There is no acute or significant abnormality.  There is only mild volume loss and minimal nonspecific white matter change.  There is no hemorrhage.  There is no mass or mass effect.  There are no regions of restricted diffusion to suggest acute infarction.  There is no hydrocephalus or midline shift.  There is no abnormal extra-axial fluid collection.  The basilar cisterns are open.  Flow voids indicating patency are present in the major vessels at the base of the brain.  The cerebellar tonsils are normal position.  Sellar structures are normal.  The orbits are normal.     Extracranial contents, calvarium, soft tissues:Baseline marrow signal is normal.  There is a small mucous retention cyst in the floor of the right maxillary sinus.  Otherwise, the paranasal sinuses and mastoid air cells are clear.     Impression:  1. There is no acute abnormality.  There is mild volume loss and minimal nonspecific white matter change.    Labs:  CBC:   Lab Results   Component Value Date    WBC 3.55 (L) 04/04/2022    HGB 14.4 04/04/2022    HCT 44.8 04/04/2022     04/04/2022    MCV 99 (H) 04/04/2022    RDW 13.2 04/04/2022     BMP:   Lab Results   Component Value Date     04/04/2022     K 5.2 (H) 04/04/2022     04/04/2022    CO2 28 04/04/2022    BUN 24 (H) 04/04/2022    CREATININE 1.1 04/04/2022    GLU 67 (L) 04/04/2022    CALCIUM 9.8 04/04/2022     LFTS;   Lab Results   Component Value Date    PROT 6.9 04/04/2022    ALBUMIN 3.9 04/04/2022    BILITOT 0.6 04/04/2022    AST 22 04/04/2022    ALKPHOS 99 04/04/2022    ALT 20 04/04/2022     COAGS: No results found for: INR, PROTIME, PTT  FLP:   Lab Results   Component Value Date    CHOL 196 04/04/2022    HDL 70 04/04/2022    LDLCALC 108.6 04/04/2022    TRIG 87 04/04/2022    CHOLHDL 35.7 04/04/2022     Component      Latest Ref Rng & Units 4/4/2022   Hemoglobin A1C External      4.0 - 5.6 % 5.3   Estimated Avg Glucose      68 - 131 mg/dL 105   TSH      0.400 - 4.000 uIU/mL 2.830   HIV 1/2 Ag/Ab      Negative Negative     Component      Latest Ref Rng & Units 4/27/2022   Folate      4.0 - 24.0 ng/mL 10.5   Methlymalonic Acid      <0.40 umol/L 0.18   RPR      Non-reactive Non-reactive   Thiamine      38 - 122 ug/L 73   Vitamin B-12      210 - 950 pg/mL 335     ASSESSMENT & PLAN:  Gerard L Schoen III is a 66 y.o. R-handed male seen in follow up for memory loss.     Problem List Items Addressed This Visit        Neuro    Memory loss - Primary    Overview     MMSE:  27/30 April 2022    MRI brain May 2022 - minimal age related changes           Current Assessment & Plan     MRI and serologies unremarkable  Will follow up on previously ordered HST to r/o JASON   NP testing previously ordered -- discussed utility and rationale again. He will consider if he would like to pursue this now or at a later date.   No safety concerns today.   Continue efforts to maximize vascular health.                 Cardiac/Vascular    Coronary artery disease of native artery of native heart with stable angina pectoris (Chronic)    Essential hypertension (Chronic)       Other    Sleep difficulties          Follow up: 6 months - 1 year    I spent a total of 21 minutes on the  day of the visit.    This includes face to face time with the patient, as well as non-face to face time preparing for and completing the visit (review of prior diagnostic testing and clinical notes, obtaining or reviewing history, documenting clinical information in the EMR, independently interpreting and communicating results to the patient/family and coordinating ongoing care).       I appreciate the opportunity to participate in the care of this patient. Please feel free to contact me with any concerns or questions.       Citlalli Reyes, ACNPC-AG  [facility]  [address] [place] [address]

## 2022-07-12 NOTE — ASSESSMENT & PLAN NOTE
MRI and serologies unremarkable  Will follow up on previously ordered HST to r/o JASON   NP testing previously ordered -- discussed utility and rationale again. He will consider if he would like to pursue this now or at a later date.   No safety concerns today.   Continue efforts to maximize vascular health.

## 2022-07-12 NOTE — Clinical Note
Previously ordered a home [facility] [address]  He has not been contacted to schedule this Otherwise, follow up with me in 6 months to 1 year for repeat memory testing, no ADLs needed

## 2022-07-13 ENCOUNTER — TELEPHONE (OUTPATIENT)
Dept: NEUROLOGY | Facility: CLINIC | Age: 67
End: 2022-07-13
Payer: COMMERCIAL

## 2022-07-13 NOTE — TELEPHONE ENCOUNTER
----- Message from Citlalli Reyes NP sent at 7/12/2022 11:37 AM CDT -----  Previously ordered a home sleep study to be done [facility] [address]   He has not been contacted to schedule this  Otherwise, follow up with me in 6 months to 1 year for repeat memory testing, no ADLs needed

## 2023-02-15 RX ORDER — ATORVASTATIN CALCIUM 40 MG/1
40 TABLET, FILM COATED ORAL DAILY
Qty: 90 TABLET | Refills: 0 | Status: SHIPPED | OUTPATIENT
Start: 2023-02-15 | End: 2023-03-20 | Stop reason: SDUPTHER

## 2023-02-15 RX ORDER — IRBESARTAN 75 MG/1
75 TABLET ORAL DAILY
Qty: 90 TABLET | Refills: 1 | OUTPATIENT
Start: 2023-02-15

## 2023-02-15 NOTE — TELEPHONE ENCOUNTER
DUPLICATE   Consent was obtained from the patient. The risks, benefits and alternatives to therapy were discussed in detail. Specifically, the risks of infection, scarring, bleeding, prolonged wound healing, nerve injury, incomplete removal, allergy to anesthesia and recurrence were addressed. Alternatives to ED&C, such as: surgical removal and XRT were also discussed.  Prior to the procedure, the treatment site was clearly identified and confirmed by the patient. All components of Universal Protocol/PAUSE Rule completed.

## 2023-02-15 NOTE — TELEPHONE ENCOUNTER
Care Due:                  Date            Visit Type   Department     Provider  --------------------------------------------------------------------------------                                MYCHART                              ANNUAL                              CHECKUP/PHY  MET INTERNAL  Last Visit: 04-      S            NEHA Montana                              EP -                              PRIMARY      [facility] INTERNAL  Next Visit: 04-      CARE ([facility])   MEDICINE       Ata Montana                                                            Last  Test          Frequency    Reason                     Performed    Due Date  --------------------------------------------------------------------------------    CMP.........  12 months..  atorvastatin, irbesartan.  04- 03-    Lipid Panel.  12 months..  atorvastatin.............  04- 03-    [employer] Embedded Care Gaps. Reference number: [id number]. 2/15/2023   12:10:07 PM CST

## 2023-02-16 RX ORDER — IRBESARTAN 75 MG/1
75 TABLET ORAL DAILY
Qty: 90 TABLET | Refills: 0 | Status: SHIPPED | OUTPATIENT
Start: 2023-02-16 | End: 2023-03-20 | Stop reason: SDUPTHER

## 2023-03-20 RX ORDER — IRBESARTAN 75 MG/1
75 TABLET ORAL DAILY
Qty: 90 TABLET | Refills: 0 | Status: SHIPPED | OUTPATIENT
Start: 2023-03-20 | End: 2023-04-05 | Stop reason: SDUPTHER

## 2023-03-20 RX ORDER — ATORVASTATIN CALCIUM 40 MG/1
40 TABLET, FILM COATED ORAL DAILY
Qty: 90 TABLET | Refills: 0 | Status: SHIPPED | OUTPATIENT
Start: 2023-03-20 | End: 2023-04-05 | Stop reason: SDUPTHER

## 2023-03-20 NOTE — TELEPHONE ENCOUNTER
No new care gaps identified.  [facility] Embedded Care Gaps. Reference number: [id number]. 3/20/2023   12:14:08 PM CDT

## 2023-03-20 NOTE — TELEPHONE ENCOUNTER
Refill Decision Note   Remberto Schoen  is requesting a refill authorization.  Brief Assessment and Rationale for Refill:  Approve     Medication Therapy Plan:       Medication Reconciliation Completed: No    Comments:     No Care Gaps recommended.     Note composed:5:48 PM 03/20/2023

## 2023-03-20 NOTE — TELEPHONE ENCOUNTER
Refill Decision Note   Remberto Schoen  is requesting a refill authorization.  Brief Assessment and Rationale for Refill:  Approve     Medication Therapy Plan:       Medication Reconciliation Completed: No    Comments:     No Care Gaps recommended.     Note composed:6:12 PM 03/20/2023

## 2023-03-20 NOTE — TELEPHONE ENCOUNTER
No new care gaps identified.  [facility] Embedded Care Gaps. Reference number: [id number]. 3/20/2023   5:48:31 PM CDT

## 2023-04-05 ENCOUNTER — LAB VISIT (OUTPATIENT)
Dept: LAB | Facility: HOSPITAL | Age: 68
End: 2023-04-05
Attending: INTERNAL MEDICINE
Payer: COMMERCIAL

## 2023-04-05 ENCOUNTER — OFFICE VISIT (OUTPATIENT)
Dept: INTERNAL MEDICINE | Facility: CLINIC | Age: 68
End: 2023-04-05
Payer: COMMERCIAL

## 2023-04-05 VITALS
TEMPERATURE: 98 F | HEIGHT: 71 IN | RESPIRATION RATE: 12 BRPM | HEART RATE: 67 BPM | BODY MASS INDEX: 26.33 KG/M2 | DIASTOLIC BLOOD PRESSURE: 70 MMHG | SYSTOLIC BLOOD PRESSURE: 122 MMHG | WEIGHT: 188.06 LBS

## 2023-04-05 DIAGNOSIS — Z12.5 PROSTATE CANCER SCREENING: ICD-10-CM

## 2023-04-05 DIAGNOSIS — I10 ESSENTIAL HYPERTENSION: Chronic | ICD-10-CM

## 2023-04-05 DIAGNOSIS — K21.9 GASTROESOPHAGEAL REFLUX DISEASE, UNSPECIFIED WHETHER ESOPHAGITIS PRESENT: Chronic | ICD-10-CM

## 2023-04-05 DIAGNOSIS — Z00.00 ANNUAL PHYSICAL EXAM: Primary | ICD-10-CM

## 2023-04-05 DIAGNOSIS — I25.118 CORONARY ARTERY DISEASE OF NATIVE ARTERY OF NATIVE HEART WITH STABLE ANGINA PECTORIS: Chronic | ICD-10-CM

## 2023-04-05 DIAGNOSIS — Z00.00 ANNUAL PHYSICAL EXAM: ICD-10-CM

## 2023-04-05 LAB
ALBUMIN SERPL BCP-MCNC: 4.3 G/DL (ref 3.5–5.2)
ALP SERPL-CCNC: 79 U/L (ref 55–135)
ALT SERPL W/O P-5'-P-CCNC: 20 U/L (ref 10–44)
ANION GAP SERPL CALC-SCNC: 12 MMOL/L (ref 8–16)
AST SERPL-CCNC: 28 U/L (ref 10–40)
BASOPHILS # BLD AUTO: 0.02 K/UL (ref 0–0.2)
BASOPHILS NFR BLD: 0.5 % (ref 0–1.9)
BILIRUB SERPL-MCNC: 0.9 MG/DL (ref 0.1–1)
BUN SERPL-MCNC: 27 MG/DL (ref 8–23)
CALCIUM SERPL-MCNC: 9.9 MG/DL (ref 8.7–10.5)
CHLORIDE SERPL-SCNC: 109 MMOL/L (ref 95–110)
CHOLEST SERPL-MCNC: 141 MG/DL (ref 120–199)
CHOLEST/HDLC SERPL: 2.3 {RATIO} (ref 2–5)
CO2 SERPL-SCNC: 23 MMOL/L (ref 23–29)
COMPLEXED PSA SERPL-MCNC: 1.2 NG/ML (ref 0–4)
CREAT SERPL-MCNC: 1.1 MG/DL (ref 0.5–1.4)
DIFFERENTIAL METHOD: ABNORMAL
EOSINOPHIL # BLD AUTO: 0.1 K/UL (ref 0–0.5)
EOSINOPHIL NFR BLD: 2.1 % (ref 0–8)
ERYTHROCYTE [DISTWIDTH] IN BLOOD BY AUTOMATED COUNT: 13 % (ref 11.5–14.5)
EST. GFR  (NO RACE VARIABLE): >60 ML/MIN/1.73 M^2
ESTIMATED AVG GLUCOSE: 105 MG/DL (ref 68–131)
GLUCOSE SERPL-MCNC: 97 MG/DL (ref 70–110)
HBA1C MFR BLD: 5.3 % (ref 4–5.6)
HCT VFR BLD AUTO: 41.5 % (ref 40–54)
HDLC SERPL-MCNC: 61 MG/DL (ref 40–75)
HDLC SERPL: 43.3 % (ref 20–50)
HGB BLD-MCNC: 13.7 G/DL (ref 14–18)
IMM GRANULOCYTES # BLD AUTO: 0 K/UL (ref 0–0.04)
IMM GRANULOCYTES NFR BLD AUTO: 0 % (ref 0–0.5)
LDLC SERPL CALC-MCNC: 68 MG/DL (ref 63–159)
LYMPHOCYTES # BLD AUTO: 1.4 K/UL (ref 1–4.8)
LYMPHOCYTES NFR BLD: 34.2 % (ref 18–48)
MCH RBC QN AUTO: 31.7 PG (ref 27–31)
MCHC RBC AUTO-ENTMCNC: 33 G/DL (ref 32–36)
MCV RBC AUTO: 96 FL (ref 82–98)
MONOCYTES # BLD AUTO: 0.5 K/UL (ref 0.3–1)
MONOCYTES NFR BLD: 11.4 % (ref 4–15)
NEUTROPHILS # BLD AUTO: 2.2 K/UL (ref 1.8–7.7)
NEUTROPHILS NFR BLD: 51.8 % (ref 38–73)
NONHDLC SERPL-MCNC: 80 MG/DL
NRBC BLD-RTO: 0 /100 WBC
PLATELET # BLD AUTO: 189 K/UL (ref 150–450)
PMV BLD AUTO: 10.8 FL (ref 9.2–12.9)
POTASSIUM SERPL-SCNC: 4.4 MMOL/L (ref 3.5–5.1)
PROT SERPL-MCNC: 6.8 G/DL (ref 6–8.4)
RBC # BLD AUTO: 4.32 M/UL (ref 4.6–6.2)
SODIUM SERPL-SCNC: 144 MMOL/L (ref 136–145)
T4 FREE SERPL-MCNC: 1.04 NG/DL (ref 0.71–1.51)
TRIGL SERPL-MCNC: 60 MG/DL (ref 30–150)
TSH SERPL DL<=0.005 MIU/L-ACNC: 4.39 UIU/ML (ref 0.4–4)
WBC # BLD AUTO: 4.21 K/UL (ref 3.9–12.7)

## 2023-04-05 PROCEDURE — 1160F RVW MEDS BY RX/DR IN RCRD: CPT | Mod: CPTII,S$GLB,, | Performed by: INTERNAL MEDICINE

## 2023-04-05 PROCEDURE — 1159F PR MEDICATION LIST DOCUMENTED IN MEDICAL RECORD: ICD-10-PCS | Mod: CPTII,S$GLB,, | Performed by: INTERNAL MEDICINE

## 2023-04-05 PROCEDURE — 3074F PR MOST RECENT SYSTOLIC BLOOD PRESSURE < 130 MM HG: ICD-10-PCS | Mod: CPTII,S$GLB,, | Performed by: INTERNAL MEDICINE

## 2023-04-05 PROCEDURE — 1126F PR PAIN SEVERITY QUANTIFIED, NO PAIN PRESENT: ICD-10-PCS | Mod: CPTII,S$GLB,, | Performed by: INTERNAL MEDICINE

## 2023-04-05 PROCEDURE — 99999 PR PBB SHADOW E&M-EST. PATIENT-LVL III: CPT | Mod: PBBFAC,,, | Performed by: INTERNAL MEDICINE

## 2023-04-05 PROCEDURE — 1101F PR PT FALLS ASSESS DOC 0-1 FALLS W/OUT INJ PAST YR: ICD-10-PCS | Mod: CPTII,S$GLB,, | Performed by: INTERNAL MEDICINE

## 2023-04-05 PROCEDURE — 36415 COLL VENOUS BLD VENIPUNCTURE: CPT | Mod: PO | Performed by: INTERNAL MEDICINE

## 2023-04-05 PROCEDURE — 3078F PR MOST RECENT DIASTOLIC BLOOD PRESSURE < 80 MM HG: ICD-10-PCS | Mod: CPTII,S$GLB,, | Performed by: INTERNAL MEDICINE

## 2023-04-05 PROCEDURE — 4010F ACE/ARB THERAPY RXD/TAKEN: CPT | Mod: CPTII,S$GLB,, | Performed by: INTERNAL MEDICINE

## 2023-04-05 PROCEDURE — 1160F PR REVIEW ALL MEDS BY PRESCRIBER/CLIN PHARMACIST DOCUMENTED: ICD-10-PCS | Mod: CPTII,S$GLB,, | Performed by: INTERNAL MEDICINE

## 2023-04-05 PROCEDURE — 80053 COMPREHEN METABOLIC PANEL: CPT | Performed by: INTERNAL MEDICINE

## 2023-04-05 PROCEDURE — 99999 PR PBB SHADOW E&M-EST. PATIENT-LVL III: ICD-10-PCS | Mod: PBBFAC,,, | Performed by: INTERNAL MEDICINE

## 2023-04-05 PROCEDURE — 3008F BODY MASS INDEX DOCD: CPT | Mod: CPTII,S$GLB,, | Performed by: INTERNAL MEDICINE

## 2023-04-05 PROCEDURE — 1159F MED LIST DOCD IN RCRD: CPT | Mod: CPTII,S$GLB,, | Performed by: INTERNAL MEDICINE

## 2023-04-05 PROCEDURE — 84153 ASSAY OF PSA TOTAL: CPT | Performed by: INTERNAL MEDICINE

## 2023-04-05 PROCEDURE — 3074F SYST BP LT 130 MM HG: CPT | Mod: CPTII,S$GLB,, | Performed by: INTERNAL MEDICINE

## 2023-04-05 PROCEDURE — 3288F PR FALLS RISK ASSESSMENT DOCUMENTED: ICD-10-PCS | Mod: CPTII,S$GLB,, | Performed by: INTERNAL MEDICINE

## 2023-04-05 PROCEDURE — 1101F PT FALLS ASSESS-DOCD LE1/YR: CPT | Mod: CPTII,S$GLB,, | Performed by: INTERNAL MEDICINE

## 2023-04-05 PROCEDURE — 1126F AMNT PAIN NOTED NONE PRSNT: CPT | Mod: CPTII,S$GLB,, | Performed by: INTERNAL MEDICINE

## 2023-04-05 PROCEDURE — 84443 ASSAY THYROID STIM HORMONE: CPT | Performed by: INTERNAL MEDICINE

## 2023-04-05 PROCEDURE — 99397 PR PREVENTIVE VISIT,EST,65 & OVER: ICD-10-PCS | Mod: S$GLB,,, | Performed by: INTERNAL MEDICINE

## 2023-04-05 PROCEDURE — 83036 HEMOGLOBIN GLYCOSYLATED A1C: CPT | Performed by: INTERNAL MEDICINE

## 2023-04-05 PROCEDURE — 3078F DIAST BP <80 MM HG: CPT | Mod: CPTII,S$GLB,, | Performed by: INTERNAL MEDICINE

## 2023-04-05 PROCEDURE — 99397 PER PM REEVAL EST PAT 65+ YR: CPT | Mod: S$GLB,,, | Performed by: INTERNAL MEDICINE

## 2023-04-05 PROCEDURE — 80061 LIPID PANEL: CPT | Performed by: INTERNAL MEDICINE

## 2023-04-05 PROCEDURE — 85025 COMPLETE CBC W/AUTO DIFF WBC: CPT | Performed by: INTERNAL MEDICINE

## 2023-04-05 PROCEDURE — 3288F FALL RISK ASSESSMENT DOCD: CPT | Mod: CPTII,S$GLB,, | Performed by: INTERNAL MEDICINE

## 2023-04-05 PROCEDURE — 3008F PR BODY MASS INDEX (BMI) DOCUMENTED: ICD-10-PCS | Mod: CPTII,S$GLB,, | Performed by: INTERNAL MEDICINE

## 2023-04-05 PROCEDURE — 84439 ASSAY OF FREE THYROXINE: CPT | Performed by: INTERNAL MEDICINE

## 2023-04-05 PROCEDURE — 4010F PR ACE/ARB THEARPY RXD/TAKEN: ICD-10-PCS | Mod: CPTII,S$GLB,, | Performed by: INTERNAL MEDICINE

## 2023-04-05 RX ORDER — IRBESARTAN 75 MG/1
75 TABLET ORAL DAILY
Qty: 90 TABLET | Refills: 3 | Status: SHIPPED | OUTPATIENT
Start: 2023-04-05 | End: 2023-12-24 | Stop reason: SDUPTHER

## 2023-04-05 RX ORDER — PANTOPRAZOLE SODIUM 40 MG/1
40 TABLET, DELAYED RELEASE ORAL 2 TIMES DAILY
Qty: 180 TABLET | Refills: 3 | Status: SHIPPED | OUTPATIENT
Start: 2023-04-05 | End: 2023-07-03 | Stop reason: SDUPTHER

## 2023-04-05 RX ORDER — ATORVASTATIN CALCIUM 40 MG/1
40 TABLET, FILM COATED ORAL DAILY
Qty: 90 TABLET | Refills: 3 | Status: SHIPPED | OUTPATIENT
Start: 2023-04-05 | End: 2023-08-07 | Stop reason: SDUPTHER

## 2023-04-05 NOTE — PROGRESS NOTES
Subjective:       Patient ID: Gerard L Schoen III is a 67 y.o. male.    Chief Complaint: Annual Exam    HPI    67 y.o. male here for annual exam.     Cholesterol: needs  Vaccines: Influenza - ; Tetanus - 2019; Prevnar 20 - 23 done; Zoster - 2 done; COVID - 3 done  Sexual Screening: active  STD screening: no concern  Eye exam: not completely certain  Prostate: needs  Colonoscopy: , due   A1c: needs    Exercise: 6 days a week.  Cardio and weights.  Diet: does meal plan type of thing.  Weekends, does his type of thing.    Past Medical History:   Diagnosis Date    Colon polyp 2015    Coronary artery disease involving native coronary artery of native heart without angina pectoris 2017    Essential hypertension 2019    GERD (gastroesophageal reflux disease)      Past Surgical History:   Procedure Laterality Date    COLONOSCOPY      diverticulosis    COLONOSCOPY N/A 2021    Procedure: COLONOSCOPY;  Surgeon: Shant Navarro MD;  Location: [address]);  Service: Endoscopy;  Laterality: N/A;  COVID + on 20 - prep ins. emailed - [facility]    PROSTATE SURGERY       Social History     Socioeconomic History    Marital status:      Spouse name: Andria    Number of children: 1   Occupational History    Occupation:      Employer: [employer]     Comment: [employer]    Tobacco Use    Smoking status: Former     Packs/day: 0.00     Years: 0.00     Pack years: 0.00     Types: Cigarettes     Quit date: 2006     Years since quittin.2    Smokeless tobacco: Former    Tobacco comments:     No smoking in last 10 years   Substance and Sexual Activity    Alcohol use: No    Drug use: No    Sexual activity: Yes     Partners: Female     Comment:      Social Determinants of Health     Financial Resource Strain: Low Risk     Difficulty of Paying Living Expenses: Not very hard   Food Insecurity: No Food Insecurity    Worried About Running Out of Food in the  Last Year: Never true    Ran Out of Food in the Last Year: Never true   Transportation Needs: No Transportation Needs    Lack of Transportation (Medical): No    Lack of Transportation (Non-Medical): No   Physical Activity: Sufficiently Active    Days of Exercise per Week: 6 days    Minutes of Exercise per Session: 50 min   Stress: No Stress Concern Present    Feeling of Stress : Only a little   Social Connections: Unknown    Frequency of Communication with Friends and Family: Three times a week    Frequency of Social Gatherings with Friends and Family: Once a week    Active Member of Clubs or Organizations: No    Attends Club or Organization Meetings: 1 to 4 times per year    Marital Status:    Housing Stability: Low Risk     Unable to Pay for Housing in the Last Year: No    Number of Places Lived in the Last Year: 1    Unstable Housing in the Last Year: No     Review of patient's allergies indicates:  No Known Allergies  Gerard Jerry Schoen L. Schoen III had no medications administered during this visit.    Review of Systems      Objective:      Physical Exam  Vitals reviewed.   Constitutional:       Appearance: He is well-developed.   HENT:      Head: Normocephalic and atraumatic.      Mouth/Throat:      Pharynx: No oropharyngeal exudate.   Eyes:      General: No scleral icterus.        Right eye: No discharge.         Left eye: No discharge.      Pupils: Pupils are equal, round, and reactive to light.   Neck:      Thyroid: No thyromegaly.      Trachea: No tracheal deviation.   Cardiovascular:      Rate and Rhythm: Normal rate and regular rhythm.      Heart sounds: Normal heart sounds. No murmur heard.    No friction rub. No gallop.   Pulmonary:      Effort: Pulmonary effort is normal. No respiratory distress.      Breath sounds: Normal breath sounds. No wheezing or rales.   Chest:      Chest wall: No tenderness.   Abdominal:      General: Bowel sounds are normal. There is no distension.      Palpations:  Abdomen is soft. There is no mass.      Tenderness: There is no abdominal tenderness. There is no guarding or rebound.   Musculoskeletal:         General: No tenderness. Normal range of motion.      Cervical back: Normal range of motion and neck supple.   Skin:     General: Skin is warm and dry.      Coloration: Skin is not pale.      Findings: No erythema or rash.   Neurological:      Mental Status: He is alert and oriented to person, place, and time.   Psychiatric:         Behavior: Behavior normal.       Assessment:       1. Annual physical exam  - CBC Auto Differential; Future  - Comprehensive Metabolic Panel; Future  - TSH; Future  - Lipid Panel; Future  - Hemoglobin A1C; Future    2. Essential hypertension    3. Coronary artery disease of native artery of native heart with stable angina pectoris    4. Gastroesophageal reflux disease, unspecified whether esophagitis present    5. Prostate cancer screening  - PSA, Screening; Future      Plan:       1. Check CBC, CMP, TSH, lipids, A1c discussed diet and exercise.  Discussed vaccines.  2.  Continue irbesartan 75 mg p.o.  3.  Continue.  4.  Continue Protonix 40 mg.    5. Check PSA.

## 2023-04-06 ENCOUNTER — PATIENT MESSAGE (OUTPATIENT)
Dept: INTERNAL MEDICINE | Facility: CLINIC | Age: 68
End: 2023-04-06
Payer: COMMERCIAL

## 2023-04-06 ENCOUNTER — TELEPHONE (OUTPATIENT)
Dept: INTERNAL MEDICINE | Facility: CLINIC | Age: 68
End: 2023-04-06
Payer: COMMERCIAL

## 2023-04-06 DIAGNOSIS — E03.8 SUBCLINICAL HYPOTHYROIDISM: Primary | ICD-10-CM

## 2023-04-06 NOTE — TELEPHONE ENCOUNTER
Your electrolytes, kidney/liver function, prostate antigen are normal.  A1c indicates you are not diabetic.  Your thyroid function is suppressed. Your free T4 is normal.  Let us recheck this in 3 months.

## 2023-07-03 RX ORDER — PANTOPRAZOLE SODIUM 40 MG/1
40 TABLET, DELAYED RELEASE ORAL 2 TIMES DAILY
Qty: 180 TABLET | Refills: 1 | Status: SHIPPED | OUTPATIENT
Start: 2023-07-03 | End: 2024-01-13 | Stop reason: SDUPTHER

## 2023-07-03 RX ORDER — SUCRALFATE 1 G/1
1 TABLET ORAL 2 TIMES DAILY
Qty: 60 TABLET | Refills: 1 | Status: SHIPPED | OUTPATIENT
Start: 2023-07-03 | End: 2023-10-27 | Stop reason: SDUPTHER

## 2023-07-03 NOTE — TELEPHONE ENCOUNTER
No care due was identified.  [facility] Embedded Care Due Messages. Reference number: [id number].   7/03/2023 10:47:13 AM CDT

## 2023-08-07 NOTE — TELEPHONE ENCOUNTER
No care due was identified.  [facility] Embedded Care Due Messages. Reference number: [id number].   8/07/2023 9:12:34 AM CDT

## 2023-08-08 RX ORDER — ATORVASTATIN CALCIUM 40 MG/1
40 TABLET, FILM COATED ORAL DAILY
Qty: 90 TABLET | Refills: 2 | Status: SHIPPED | OUTPATIENT
Start: 2023-08-08

## 2023-08-08 NOTE — TELEPHONE ENCOUNTER
Refill Decision Note   Gerard Schoen  is requesting a refill authorization.  Brief Assessment and Rationale for Refill:  Approve     Medication Therapy Plan:         Comments:     Note composed:11:01 AM 08/08/2023

## 2023-10-27 RX ORDER — SUCRALFATE 1 G/1
1 TABLET ORAL 2 TIMES DAILY
Qty: 60 TABLET | Refills: 1 | Status: SHIPPED | OUTPATIENT
Start: 2023-10-27 | End: 2023-11-29 | Stop reason: SDUPTHER

## 2023-11-29 RX ORDER — SUCRALFATE 1 G/1
1 TABLET ORAL 2 TIMES DAILY
Qty: 60 TABLET | Refills: 1 | Status: SHIPPED | OUTPATIENT
Start: 2023-11-29 | End: 2024-02-24 | Stop reason: SDUPTHER

## 2023-11-29 NOTE — TELEPHONE ENCOUNTER
Refill Routing Note   Medication(s) are not appropriate for processing by [facility] for the following reason(s):        Outside of protocol    [facility] action(s):  Route               Appointments  past 12m or future 3m with PCP    Date Provider   Last Visit   4/5/2023 Ata Montana MD   Next Visit   Visit date not found Ata Montana MD   ED visits in past 90 days: 0        Note composed:10:39 AM 11/29/2023

## 2023-12-24 RX ORDER — IRBESARTAN 75 MG/1
75 TABLET ORAL DAILY
Qty: 90 TABLET | Refills: 1 | Status: SHIPPED | OUTPATIENT
Start: 2023-12-24 | End: 2024-01-13 | Stop reason: SDUPTHER

## 2023-12-24 NOTE — TELEPHONE ENCOUNTER
No care due was identified.  [facility] Embedded Care Due Messages. Reference number: [id number].   12/24/2023 5:09:49 AM CST

## 2024-01-13 NOTE — TELEPHONE ENCOUNTER
No care due was identified.  [facility] Embedded Care Due Messages. Reference number: [id number].   1/13/2024 11:59:59 AM CST

## 2024-01-13 NOTE — TELEPHONE ENCOUNTER
Care Due:                  Date            Visit Type   Department     Provider  --------------------------------------------------------------------------------                                EP -                              PRIMARY      MET INTERNAL  Last Visit: 04-      CARE ([facility])   MEDICINE       Ata Montana  Next Visit: None Scheduled  None         None Found                                                            Last  Test          Frequency    Reason                     Performed    Due Date  --------------------------------------------------------------------------------    CMP.........  12 months..  atorvastatin, irbesartan.  04- 03-    Lipid Panel.  12 months..  atorvastatin.............  04- 03-    [facility] Embedded Care Due Messages. Reference number: [id number].   1/13/2024 11:59:45 AM CST

## 2024-01-14 RX ORDER — IRBESARTAN 75 MG/1
75 TABLET ORAL DAILY
Qty: 90 TABLET | Refills: 0 | Status: SHIPPED | OUTPATIENT
Start: 2024-01-14 | End: 2024-02-24 | Stop reason: SDUPTHER

## 2024-01-14 NOTE — TELEPHONE ENCOUNTER
Refill Routing Note   Medication(s) are not appropriate for processing by [facility] for the following reason(s):        Outside of protocol    [facility] action(s):  Route     Requires labs : Yes      Medication Therapy Plan: Total daily dose outside of [facility] protocol      Appointments  past 12m or future 3m with PCP    Date Provider   Last Visit   4/5/2023 Ata Montana MD   Next Visit   1/13/2024 Ata Montana MD   ED visits in past 90 days: 0        Note composed:1:17 PM 01/14/2024

## 2024-01-15 RX ORDER — PANTOPRAZOLE SODIUM 40 MG/1
40 TABLET, DELAYED RELEASE ORAL 2 TIMES DAILY
Qty: 180 TABLET | Refills: 0 | Status: SHIPPED | OUTPATIENT
Start: 2024-01-15

## 2024-01-16 DIAGNOSIS — Z00.00 ANNUAL PHYSICAL EXAM: Primary | ICD-10-CM

## 2024-02-24 RX ORDER — IRBESARTAN 75 MG/1
75 TABLET ORAL DAILY
Qty: 90 TABLET | Refills: 0 | Status: SHIPPED | OUTPATIENT
Start: 2024-02-24 | End: 2024-05-27 | Stop reason: SDUPTHER

## 2024-02-24 NOTE — TELEPHONE ENCOUNTER
No care due was identified.  [facility] Embedded Care Due Messages. Reference number: [id number].   2/24/2024 10:59:35 AM CST

## 2024-02-25 RX ORDER — SUCRALFATE 1 G/1
1 TABLET ORAL 2 TIMES DAILY
Qty: 60 TABLET | Refills: 1 | Status: SHIPPED | OUTPATIENT
Start: 2024-02-25 | End: 2024-05-27 | Stop reason: SDUPTHER

## 2024-02-25 NOTE — TELEPHONE ENCOUNTER
Refill Decision Note   Gerard Schoen  is requesting a refill authorization.  Brief Assessment and Rationale for Refill:  Approve     Medication Therapy Plan:         Comments:     Note composed:9:44 PM 02/24/2024

## 2024-03-28 ENCOUNTER — LAB VISIT (OUTPATIENT)
Dept: LAB | Facility: HOSPITAL | Age: 69
End: 2024-03-28
Attending: INTERNAL MEDICINE
Payer: COMMERCIAL

## 2024-03-28 DIAGNOSIS — Z00.00 ANNUAL PHYSICAL EXAM: ICD-10-CM

## 2024-03-28 LAB
ALBUMIN SERPL BCP-MCNC: 4.1 G/DL (ref 3.5–5.2)
ALP SERPL-CCNC: 90 U/L (ref 55–135)
ALT SERPL W/O P-5'-P-CCNC: 23 U/L (ref 10–44)
ANION GAP SERPL CALC-SCNC: 7 MMOL/L (ref 8–16)
AST SERPL-CCNC: 26 U/L (ref 10–40)
BASOPHILS # BLD AUTO: 0.03 K/UL (ref 0–0.2)
BASOPHILS NFR BLD: 0.6 % (ref 0–1.9)
BILIRUB SERPL-MCNC: 0.7 MG/DL (ref 0.1–1)
BUN SERPL-MCNC: 22 MG/DL (ref 8–23)
CALCIUM SERPL-MCNC: 9.5 MG/DL (ref 8.7–10.5)
CHLORIDE SERPL-SCNC: 108 MMOL/L (ref 95–110)
CHOLEST SERPL-MCNC: 144 MG/DL (ref 120–199)
CHOLEST/HDLC SERPL: 2.3 {RATIO} (ref 2–5)
CO2 SERPL-SCNC: 24 MMOL/L (ref 23–29)
COMPLEXED PSA SERPL-MCNC: 1.3 NG/ML (ref 0–4)
CREAT SERPL-MCNC: 1.1 MG/DL (ref 0.5–1.4)
DIFFERENTIAL METHOD BLD: ABNORMAL
EOSINOPHIL # BLD AUTO: 0.1 K/UL (ref 0–0.5)
EOSINOPHIL NFR BLD: 2 % (ref 0–8)
ERYTHROCYTE [DISTWIDTH] IN BLOOD BY AUTOMATED COUNT: 12.9 % (ref 11.5–14.5)
EST. GFR  (NO RACE VARIABLE): >60 ML/MIN/1.73 M^2
ESTIMATED AVG GLUCOSE: 108 MG/DL (ref 68–131)
GLUCOSE SERPL-MCNC: 96 MG/DL (ref 70–110)
HBA1C MFR BLD: 5.4 % (ref 4–5.6)
HCT VFR BLD AUTO: 44.1 % (ref 40–54)
HDLC SERPL-MCNC: 64 MG/DL (ref 40–75)
HDLC SERPL: 44.4 % (ref 20–50)
HGB BLD-MCNC: 14.6 G/DL (ref 14–18)
IMM GRANULOCYTES # BLD AUTO: 0.02 K/UL (ref 0–0.04)
IMM GRANULOCYTES NFR BLD AUTO: 0.4 % (ref 0–0.5)
LDLC SERPL CALC-MCNC: 69.2 MG/DL (ref 63–159)
LYMPHOCYTES # BLD AUTO: 1.6 K/UL (ref 1–4.8)
LYMPHOCYTES NFR BLD: 32 % (ref 18–48)
MCH RBC QN AUTO: 32.1 PG (ref 27–31)
MCHC RBC AUTO-ENTMCNC: 33.1 G/DL (ref 32–36)
MCV RBC AUTO: 97 FL (ref 82–98)
MONOCYTES # BLD AUTO: 0.6 K/UL (ref 0.3–1)
MONOCYTES NFR BLD: 11.5 % (ref 4–15)
NEUTROPHILS # BLD AUTO: 2.7 K/UL (ref 1.8–7.7)
NEUTROPHILS NFR BLD: 53.5 % (ref 38–73)
NONHDLC SERPL-MCNC: 80 MG/DL
NRBC BLD-RTO: 0 /100 WBC
PLATELET # BLD AUTO: 204 K/UL (ref 150–450)
PMV BLD AUTO: 10.2 FL (ref 9.2–12.9)
POTASSIUM SERPL-SCNC: 4.3 MMOL/L (ref 3.5–5.1)
PROT SERPL-MCNC: 7.1 G/DL (ref 6–8.4)
RBC # BLD AUTO: 4.55 M/UL (ref 4.6–6.2)
SODIUM SERPL-SCNC: 139 MMOL/L (ref 136–145)
TRIGL SERPL-MCNC: 54 MG/DL (ref 30–150)
TSH SERPL DL<=0.005 MIU/L-ACNC: 2.76 UIU/ML (ref 0.4–4)
WBC # BLD AUTO: 4.97 K/UL (ref 3.9–12.7)

## 2024-03-28 PROCEDURE — 36415 COLL VENOUS BLD VENIPUNCTURE: CPT | Mod: PO | Performed by: INTERNAL MEDICINE

## 2024-03-28 PROCEDURE — 84443 ASSAY THYROID STIM HORMONE: CPT | Performed by: INTERNAL MEDICINE

## 2024-03-28 PROCEDURE — 84153 ASSAY OF PSA TOTAL: CPT | Performed by: INTERNAL MEDICINE

## 2024-03-28 PROCEDURE — 80053 COMPREHEN METABOLIC PANEL: CPT | Performed by: INTERNAL MEDICINE

## 2024-03-28 PROCEDURE — 80061 LIPID PANEL: CPT | Performed by: INTERNAL MEDICINE

## 2024-03-28 PROCEDURE — 85025 COMPLETE CBC W/AUTO DIFF WBC: CPT | Performed by: INTERNAL MEDICINE

## 2024-03-28 PROCEDURE — 83036 HEMOGLOBIN GLYCOSYLATED A1C: CPT | Performed by: INTERNAL MEDICINE

## 2024-05-27 ENCOUNTER — OFFICE VISIT (OUTPATIENT)
Dept: INTERNAL MEDICINE | Facility: CLINIC | Age: 69
End: 2024-05-27
Payer: COMMERCIAL

## 2024-05-27 VITALS
TEMPERATURE: 98 F | SYSTOLIC BLOOD PRESSURE: 104 MMHG | RESPIRATION RATE: 14 BRPM | WEIGHT: 195.56 LBS | HEART RATE: 74 BPM | BODY MASS INDEX: 27.27 KG/M2 | OXYGEN SATURATION: 96 % | DIASTOLIC BLOOD PRESSURE: 78 MMHG

## 2024-05-27 DIAGNOSIS — R42 DIZZINESS: ICD-10-CM

## 2024-05-27 DIAGNOSIS — I10 ESSENTIAL HYPERTENSION: Chronic | ICD-10-CM

## 2024-05-27 DIAGNOSIS — I25.118 CORONARY ARTERY DISEASE OF NATIVE ARTERY OF NATIVE HEART WITH STABLE ANGINA PECTORIS: Chronic | ICD-10-CM

## 2024-05-27 DIAGNOSIS — K21.9 GASTROESOPHAGEAL REFLUX DISEASE, UNSPECIFIED WHETHER ESOPHAGITIS PRESENT: Chronic | ICD-10-CM

## 2024-05-27 DIAGNOSIS — Z00.00 ANNUAL PHYSICAL EXAM: Primary | ICD-10-CM

## 2024-05-27 LAB
OHS QRS DURATION: 86 MS
OHS QTC CALCULATION: 439 MS

## 2024-05-27 PROCEDURE — 3078F DIAST BP <80 MM HG: CPT | Mod: CPTII,S$GLB,, | Performed by: INTERNAL MEDICINE

## 2024-05-27 PROCEDURE — 4010F ACE/ARB THERAPY RXD/TAKEN: CPT | Mod: CPTII,S$GLB,, | Performed by: INTERNAL MEDICINE

## 2024-05-27 PROCEDURE — 1159F MED LIST DOCD IN RCRD: CPT | Mod: CPTII,S$GLB,, | Performed by: INTERNAL MEDICINE

## 2024-05-27 PROCEDURE — 3074F SYST BP LT 130 MM HG: CPT | Mod: CPTII,S$GLB,, | Performed by: INTERNAL MEDICINE

## 2024-05-27 PROCEDURE — 1101F PT FALLS ASSESS-DOCD LE1/YR: CPT | Mod: CPTII,S$GLB,, | Performed by: INTERNAL MEDICINE

## 2024-05-27 PROCEDURE — 99397 PER PM REEVAL EST PAT 65+ YR: CPT | Mod: S$GLB,,, | Performed by: INTERNAL MEDICINE

## 2024-05-27 PROCEDURE — 1126F AMNT PAIN NOTED NONE PRSNT: CPT | Mod: CPTII,S$GLB,, | Performed by: INTERNAL MEDICINE

## 2024-05-27 PROCEDURE — 93010 ELECTROCARDIOGRAM REPORT: CPT | Mod: S$GLB,,, | Performed by: INTERNAL MEDICINE

## 2024-05-27 PROCEDURE — 1160F RVW MEDS BY RX/DR IN RCRD: CPT | Mod: CPTII,S$GLB,, | Performed by: INTERNAL MEDICINE

## 2024-05-27 PROCEDURE — 3008F BODY MASS INDEX DOCD: CPT | Mod: CPTII,S$GLB,, | Performed by: INTERNAL MEDICINE

## 2024-05-27 PROCEDURE — 3044F HG A1C LEVEL LT 7.0%: CPT | Mod: CPTII,S$GLB,, | Performed by: INTERNAL MEDICINE

## 2024-05-27 PROCEDURE — 93005 ELECTROCARDIOGRAM TRACING: CPT | Mod: S$GLB,,, | Performed by: INTERNAL MEDICINE

## 2024-05-27 PROCEDURE — 3288F FALL RISK ASSESSMENT DOCD: CPT | Mod: CPTII,S$GLB,, | Performed by: INTERNAL MEDICINE

## 2024-05-27 PROCEDURE — 99999 PR PBB SHADOW E&M-EST. PATIENT-LVL III: CPT | Mod: PBBFAC,,, | Performed by: INTERNAL MEDICINE

## 2024-05-27 RX ORDER — SUCRALFATE 1 G/1
1 TABLET ORAL 2 TIMES DAILY
Qty: 60 TABLET | Refills: 1 | Status: SHIPPED | OUTPATIENT
Start: 2024-05-27

## 2024-05-27 NOTE — PROGRESS NOTES
Subjective:       Patient ID: Gerard L Schoen III is a 68 y.o. male.    Chief Complaint: Annual Exam and Dizziness    HPI    68 y.o. male here for annual exam.     Cholesterol: WNL  Vaccines: Influenza - ; Tetanus - ; Prevnar 20 - 23 done; Zoster - 2 done; COVID - 3 done  Sexual Screening: active  STD screening: no concern  Eye exam: done last about 2 yrs ago. (Going to take care of this).  Has trouble seeing at night and depth perception is off.  Prostate: 1.3  Colonoscopy: , due   A1c: 5.4    Exercise: no weights.  Cardio - elliptical and treadmill.  Some light free weights.  Diet: subscription.  Will not eat the best stuff.  Will eat peanuts.  Will do some little things.  Craves peanut M&Ms, because of taste.    He has dizziness.  This has been happening more often lately.  When he has to belch, he feels dizzy.  It happens more consistently.  It does not last long.  This lasts seconds - like when he needs to belch.  He drinks a lot of diet coke.  He has been helped with metamucil.  Happens in the am after he has been sleeping.  He has not had this issue when he is exercising.      Past Medical History:   Diagnosis Date    Colon polyp 2015    Coronary artery disease involving native coronary artery of native heart without angina pectoris 2017    Essential hypertension 2019    GERD (gastroesophageal reflux disease)      Past Surgical History:   Procedure Laterality Date    COLONOSCOPY      diverticulosis    COLONOSCOPY N/A 2021    Procedure: COLONOSCOPY;  Surgeon: Shant Navarro MD;  Location: [address];  Service: Endoscopy;  Laterality: N/A;  COVID + on 20 - prep ins. emailed - [facility]    PROSTATE SURGERY       Social History     Socioeconomic History    Marital status:      Spouse name: Andria    Number of children: 1   Occupational History    Occupation:      Employer: [employer]     Comment: [place]    Tobacco Use     Smoking status: Former     Current packs/day: 0.00     Types: Cigarettes     Quit date: 2006     Years since quittin.4    Smokeless tobacco: Former    Tobacco comments:     No smoking in last 10 years   Substance and Sexual Activity    Alcohol use: No    Drug use: No    Sexual activity: Yes     Partners: Female     Comment:      Social Determinants of Health     Financial Resource Strain: Low Risk  (3/31/2024)    Overall Financial Resource Strain (CARDIA)     Difficulty of Paying Living Expenses: Not very hard   Food Insecurity: No Food Insecurity (3/31/2024)    Hunger Vital Sign     Worried About Running Out of Food in the Last Year: Never true     Ran Out of Food in the Last Year: Never true   Transportation Needs: No Transportation Needs (3/31/2024)    PRAPARE - Transportation     Lack of Transportation (Medical): No     Lack of Transportation (Non-Medical): No   Physical Activity: Sufficiently Active (3/31/2024)    Exercise Vital Sign     Days of Exercise per Week: 6 days     Minutes of Exercise per Session: 50 min   Stress: Stress Concern Present (3/31/2024)    [employer] - Occupational Stress Questionnaire     Feeling of Stress : To some extent   Housing Stability: Unknown (3/31/2024)    Housing Stability Vital Sign     Unable to Pay for Housing in the Last Year: No     Unstable Housing in the Last Year: No     Review of patient's allergies indicates:  No Known Allergies  Gerard Jerry Schoen L. Schoen III had no medications administered during this visit.    Review of Systems      Objective:      Physical Exam  Vitals reviewed.   Constitutional:       Appearance: He is well-developed.   HENT:      Head: Normocephalic and atraumatic.      Mouth/Throat:      Pharynx: No oropharyngeal exudate.   Eyes:      General: No scleral icterus.        Right eye: No discharge.         Left eye: No discharge.      Pupils: Pupils are equal, round, and reactive to light.   Neck:       Thyroid: No thyromegaly.      Trachea: No tracheal deviation.   Cardiovascular:      Rate and Rhythm: Normal rate and regular rhythm.      Heart sounds: Normal heart sounds. No murmur heard.     No friction rub. No gallop.   Pulmonary:      Effort: Pulmonary effort is normal. No respiratory distress.      Breath sounds: Normal breath sounds. No wheezing or rales.   Chest:      Chest wall: No tenderness.   Abdominal:      General: Bowel sounds are normal. There is no distension.      Palpations: Abdomen is soft. There is no mass.      Tenderness: There is no abdominal tenderness. There is no guarding or rebound.   Musculoskeletal:         General: No tenderness. Normal range of motion.      Cervical back: Normal range of motion and neck supple.   Skin:     General: Skin is warm and dry.      Coloration: Skin is not pale.      Findings: No erythema or rash.   Neurological:      Mental Status: He is alert and oriented to person, place, and time.   Psychiatric:         Behavior: Behavior normal.         Assessment:       1. Annual physical exam    2. Essential hypertension    3. Gastroesophageal reflux disease, unspecified whether esophagitis present    4. Coronary artery disease of native artery of native heart with stable angina pectoris  - IN OFFICE EKG 12-LEAD (to Muse)  - Stress Echo Which stress agent will be used? Treadmill Exercise; Color Flow Doppler? No; Future    5. Dizziness  - IN OFFICE EKG 12-LEAD (to Muse)  - Stress Echo Which stress agent will be used? Treadmill Exercise; Color Flow Doppler? No; Future      Plan:       1. Reviewed lab work with patient.  Discussed diet and exercise discussed vaccines   2.  Continue irbesartan 75 mg p.o.   3. Continue Protonix 40 mg, Carafate 1 g.    4/5.  Continue Lipitor 40 mg.  Check EKG, exercise stress echo.

## 2024-05-27 NOTE — TELEPHONE ENCOUNTER
Refill Routing Note   Medication(s) are not appropriate for processing by [facility] for the following reason(s):        Outside of protocol    [facility] action(s):  Route               Appointments  past 12m or future 3m with PCP    Date Provider   Last Visit   5/27/2024 Ata Montana MD   Next Visit   5/27/2024 Ata Montana MD   ED visits in past 90 days: 0        Note composed:9:55 AM 05/27/2024

## 2024-05-27 NOTE — TELEPHONE ENCOUNTER
No care due was identified.  [facility] Embedded Care Due Messages. Reference number: [id number].   5/27/2024 9:34:47 AM CDT

## 2024-05-28 RX ORDER — IRBESARTAN 75 MG/1
75 TABLET ORAL DAILY
Qty: 90 TABLET | Refills: 3 | Status: SHIPPED | OUTPATIENT
Start: 2024-05-28

## 2024-05-28 RX ORDER — PANTOPRAZOLE SODIUM 40 MG/1
40 TABLET, DELAYED RELEASE ORAL 2 TIMES DAILY
Qty: 180 TABLET | Refills: 3 | Status: SHIPPED | OUTPATIENT
Start: 2024-05-28

## 2024-05-28 RX ORDER — ATORVASTATIN CALCIUM 40 MG/1
40 TABLET, FILM COATED ORAL DAILY
Qty: 90 TABLET | Refills: 3 | Status: SHIPPED | OUTPATIENT
Start: 2024-05-28

## 2024-05-28 NOTE — TELEPHONE ENCOUNTER
Refill Routing Note   Medication(s) are not appropriate for processing by [facility] for the following reason(s):        Outside of protocol    [facility] action(s):  Route  Approve        Medication Therapy Plan: Pantoprazole-Patient's total daily dose exceeds [facility] criteria.      Appointments  past 12m or future 3m with PCP    Date Provider   Last Visit   5/27/2024 Ata Montana MD   Next Visit   Visit date not found Ata Montana MD   ED visits in past 90 days: 0        Note composed:8:13 AM 05/28/2024

## 2024-07-02 ENCOUNTER — PATIENT MESSAGE (OUTPATIENT)
Dept: INTERNAL MEDICINE | Facility: CLINIC | Age: 69
End: 2024-07-02
Payer: COMMERCIAL

## 2024-07-02 DIAGNOSIS — R14.2 BELCHING: Primary | ICD-10-CM

## 2024-08-01 ENCOUNTER — OFFICE VISIT (OUTPATIENT)
Dept: GASTROENTEROLOGY | Facility: CLINIC | Age: 69
End: 2024-08-01
Payer: COMMERCIAL

## 2024-08-01 VITALS — BODY MASS INDEX: 27.19 KG/M2 | WEIGHT: 194.25 LBS | HEIGHT: 71 IN

## 2024-08-01 DIAGNOSIS — K21.9 GASTROESOPHAGEAL REFLUX DISEASE, UNSPECIFIED WHETHER ESOPHAGITIS PRESENT: Primary | Chronic | ICD-10-CM

## 2024-08-01 DIAGNOSIS — R14.2 BELCHING: ICD-10-CM

## 2024-08-01 PROCEDURE — 99204 OFFICE O/P NEW MOD 45 MIN: CPT | Mod: S$GLB,,, | Performed by: NURSE PRACTITIONER

## 2024-08-01 PROCEDURE — 1126F AMNT PAIN NOTED NONE PRSNT: CPT | Mod: CPTII,S$GLB,, | Performed by: NURSE PRACTITIONER

## 2024-08-01 PROCEDURE — 1159F MED LIST DOCD IN RCRD: CPT | Mod: CPTII,S$GLB,, | Performed by: NURSE PRACTITIONER

## 2024-08-01 PROCEDURE — 4010F ACE/ARB THERAPY RXD/TAKEN: CPT | Mod: CPTII,S$GLB,, | Performed by: NURSE PRACTITIONER

## 2024-08-01 PROCEDURE — 3044F HG A1C LEVEL LT 7.0%: CPT | Mod: CPTII,S$GLB,, | Performed by: NURSE PRACTITIONER

## 2024-08-01 PROCEDURE — 3008F BODY MASS INDEX DOCD: CPT | Mod: CPTII,S$GLB,, | Performed by: NURSE PRACTITIONER

## 2024-08-01 PROCEDURE — 1160F RVW MEDS BY RX/DR IN RCRD: CPT | Mod: CPTII,S$GLB,, | Performed by: NURSE PRACTITIONER

## 2024-08-01 PROCEDURE — 1101F PT FALLS ASSESS-DOCD LE1/YR: CPT | Mod: CPTII,S$GLB,, | Performed by: NURSE PRACTITIONER

## 2024-08-01 PROCEDURE — 99999 PR PBB SHADOW E&M-EST. PATIENT-LVL III: CPT | Mod: PBBFAC,,, | Performed by: NURSE PRACTITIONER

## 2024-08-01 PROCEDURE — 3288F FALL RISK ASSESSMENT DOCD: CPT | Mod: CPTII,S$GLB,, | Performed by: NURSE PRACTITIONER

## 2024-08-01 RX ORDER — OMEPRAZOLE 40 MG/1
40 CAPSULE, DELAYED RELEASE ORAL
Qty: 180 CAPSULE | Refills: 3 | Status: SHIPPED | OUTPATIENT
Start: 2024-08-01 | End: 2025-08-01

## 2024-08-01 NOTE — PROGRESS NOTES
GASTROENTEROLOGY CLINIC NOTE    Chief Complaint: The primary encounter diagnosis was Gastroesophageal reflux disease, unspecified whether esophagitis present. A diagnosis of Belching was also pertinent to this visit.  Referring provider/PCP: Ata Montana MD    HPI Gerard L Schoen III is a 68 y.o. male who is a new patient to me who presents to clinic for reflux and belching.   Previously followed by Dr. Donato; last seen 2019.   Has chronic reflux. Takes Protonix 40mg BID and Carafate BID. Protonix not controlling reflux as well.   Recent flare with frequent belching causing dizziness. This has improved.     Reflux: yes accompanied by belching   Dysphagia/Odynophagia: No  Nausea/Vomiting: No  Appetite Changes: No  Abdominal Pain: No  Bowel Habits: regular bowel movements. No change in bowel habits. Takes Metamucil daily  GI Bleeding: Denies hematochezia, hematemesis, melena, BRBPR, Black/tarry stool, coffee ground emesis  Unexplained Weight Loss: No     GLP-1s: No  NSAIDs: No  Anticoagulation or Antiplatelet: No      History of H.pylori: no; IgG negative 2019; stool negative 2016; negative biopsies with EGD 2019  H.pylori Treatment:  Prior Upper Endoscopy: 2019   No procedure report available  Pathology:   Stomach, biopsy:  -Gastric antral mucosa with inactive chronic gastritis.  -An immunostain for H. Pylori is pending.  -Negative for intestinal metaplasia, dysplasia, or malignancy.   An immunostain for H. Pylori is negative for organisms. Controls are adequate.         Prior Colonoscopy: 2021 with Dr. Navarro  Impression:            - The entire examined colon is normal.     Recommendation:- Repeat colonoscopy in 5 years for screening                          purposes.        Family h/o Colon Cancer: No  Family h/o Crohn's Disease or Ulcerative Colitis: No  Family h/o Celiac Sprue: No  Abdominal Surgeries: no    Review of Systems   Constitutional:  Negative for weight loss.   HENT:  Negative for sore throat.     Eyes:  Negative for blurred vision.   Respiratory:  Negative for cough.    Cardiovascular:  Negative for chest pain.   Gastrointestinal:  Positive for heartburn. Negative for abdominal pain, blood in stool, constipation, diarrhea, melena, nausea and vomiting.        Belching   Genitourinary:  Negative for dysuria.   Musculoskeletal:  Negative for myalgias.   Skin:  Negative for rash.   Neurological:  Negative for headaches.   Endo/Heme/Allergies:  Negative for environmental allergies.   Psychiatric/Behavioral:  Negative for suicidal ideas. The patient is not nervous/anxious.        Past Medical History: has a past medical history of Colon polyp, Coronary artery disease involving native coronary artery of native heart without angina pectoris, Essential hypertension, and GERD (gastroesophageal reflux disease).    Past Surgical History: has a past surgical history that includes Colonoscopy (2008); Colonoscopy (N/A, 04/19/2021); and Prostate surgery (2022).    Family History:family history includes Cancer in his father, maternal grandfather, mother, and sister; Coronary artery disease in his paternal grandfather; Hearing loss in his father; Heart disease in his paternal grandfather.    Allergies: Review of patient's allergies indicates:  No Known Allergies    Social History: reports that he quit smoking about 18 years ago. His smoking use included cigarettes. He has quit using smokeless tobacco. He reports that he does not drink alcohol and does not use drugs.    Home medications:   Current Outpatient Medications on File Prior to Visit   Medication Sig Dispense Refill    atorvastatin (LIPITOR) 40 MG tablet Take 1 tablet (40 mg total) by mouth once daily. 90 tablet 3    irbesartan (AVAPRO) 75 MG tablet Take 1 tablet (75 mg total) by mouth once daily. 90 tablet 3    sucralfate (CARAFATE) 1 gram tablet Take 1 tablet (1 g total) by mouth 2 (two) times daily. 60 tablet 1    [DISCONTINUED] pantoprazole (PROTONIX) 40 MG  "tablet Take 1 tablet (40 mg total) by mouth 2 (two) times daily. 180 tablet 3     No current facility-administered medications on file prior to visit.       Vital signs:  Ht 5' 11" (1.803 m)   Wt 88.1 kg (194 lb 3.6 oz)   BMI 27.09 kg/m²     Physical Exam  Vitals reviewed.   Constitutional:       Appearance: Normal appearance.   HENT:      Head: Normocephalic.   Pulmonary:      Effort: Pulmonary effort is normal. No respiratory distress.   Abdominal:      General: There is no distension.      Palpations: Abdomen is soft.      Tenderness: There is no abdominal tenderness.   Skin:     General: Skin is warm.   Neurological:      Mental Status: He is alert and oriented to person, place, and time.   Psychiatric:         Behavior: Behavior normal.         Thought Content: Thought content normal.         Routine labs:  Lab Results   Component Value Date    WBC 4.97 03/28/2024    HGB 14.6 03/28/2024    HCT 44.1 03/28/2024    MCV 97 03/28/2024     03/28/2024     No results found for: "INR"  No results found for: "IRON", "FERRITIN", "TIBC", "FESATURATED"  Lab Results   Component Value Date     03/28/2024    K 4.3 03/28/2024     03/28/2024    CO2 24 03/28/2024    BUN 22 03/28/2024    CREATININE 1.1 03/28/2024    GLUF 95 08/03/2017     Lab Results   Component Value Date    ALBUMIN 4.1 03/28/2024    ALT 23 03/28/2024    AST 26 03/28/2024    ALKPHOS 90 03/28/2024    BILITOT 0.7 03/28/2024     No results found for: "GLUCOSE"  Lab Results   Component Value Date    TSH 2.757 03/28/2024     Lab Results   Component Value Date    CALCIUM 9.5 03/28/2024       Imaging:      I have reviewed prior labs, imaging, and notes.      Assessment:  1. Gastroesophageal reflux disease, unspecified whether esophagitis present    2. Belching      Chronic reflux accompanied by belching. H.pylori Negative.   EGD 2019 no significant findings.   Protonix not as effective. Taking Protonix and carafate BID.    Plan:  Orders Placed This " Encounter    omeprazole (PRILOSEC) 40 MG capsule     Stop Protonix  Start Omeprazole 40mg BID  Continue Carafate BID    Consider repeating EGD if symptoms worsen.   Consider increasing carafate to TID      Plan of care discussed with patient who is in agreement and verbalized understanding.     I have explained the planned procedures to the patient.The risks, benefits and alternatives of the procedure were also explained in detail. Patient verbalized understanding, all questions were answered. The patient agrees to proceed as planned    Follow Up: 3 months          TRES Holland,FNP-BC  [facility]    (Portions of this note were dictated using voice recognition software and may contain dictation related errors in spelling/grammar/syntax not found on text review)

## 2024-08-01 NOTE — PATIENT INSTRUCTIONS
Stop Pantoprazole  Start Omeprazole. Take twice a day.   Continue sucralfate twice a day.   Follow up 3 months.     Bloating, Belching, Flatulence  Trial Avoiding ALL of the Following for the next 2 to 4 weeks  No Artificial Sweeteners  No Dairy (milk, butter, margarine, yogurt, ice cream)  No Carbonated Beverages  No Drinking From Straws  No Chewing Gum  If your symptoms resolve completely with your avoidance of the above items, then you can add back the above items ONE AT A TIME  Add back ONLY One At A Time (each item for a 7 day period) to find out which one was responsible for your symptoms.     Tips on Belching, Bloating, and Flatulence  Belching is caused by swallowing air accumulating in the stomach. This can be from:  Eating or drinking too fast  Poorly fitting dentures  Not chewing food completely  Carbonated Beverages  Chewing on gum or sucking on hard candy  Excessive swallowing due to nervous tension or postnasal drip    To Prevent Excessive Belching Avoid:  Carbonated Beverages  Chewing Gum  Hard Candies  Simethicone may be helpful  Bloating is a sense of fullness in the upper abdomen. It can be influenced by gas and/or food accumulation in the stomach. Abdominal bloating and discomfort may be due to intestinal sensitivity or IBS. To relieve symptoms of bloating/discomfort avoid:  Broccoli  Baked Beans  Cabbage  Carbonated Drinks  Cauliflower  Chewing Gum  Hard Candy  Flatulence is gas created through bacterial action in the bowel and passes rectally. Gas that accumulates in the right upper portion of the colon can lead to pain which could seem like gallbladder pain. Gas that accumulates in the left upper portion of the colon can radiate up to the chest and seem like cardiac pain. Keep in mind that:  10-18 passages per day are normal  Primary gases are harmless and odorless  Noticeable smells are trace gases related to food intake    Foods that are likely to cause gas include:  Milk, dairy products, and  medications that contain lactose - if your body doesn't produce the enzyme (lactase) to break it down  Certain vegetables - baked beans, cauliflower, broccoli, cabbage  Certain starches - wheat, oats, corn, potatoes. Rice is a good substitute  If you are having excessive gas, you may benefit from keeping a flatus diary for three days. Note the time of gas passage and food for each meal.

## 2024-09-01 RX ORDER — SUCRALFATE 1 G/1
1 TABLET ORAL 2 TIMES DAILY
Qty: 60 TABLET | Refills: 3 | Status: SHIPPED | OUTPATIENT
Start: 2024-09-01

## 2024-11-30 ENCOUNTER — PATIENT MESSAGE (OUTPATIENT)
Dept: INTERNAL MEDICINE | Facility: CLINIC | Age: 69
End: 2024-11-30
Payer: COMMERCIAL

## 2024-11-30 DIAGNOSIS — R14.2 BELCHING: ICD-10-CM

## 2024-11-30 RX ORDER — OMEPRAZOLE 40 MG/1
40 CAPSULE, DELAYED RELEASE ORAL
Qty: 180 CAPSULE | Refills: 1 | Status: SHIPPED | OUTPATIENT
Start: 2024-11-30 | End: 2025-11-30

## 2025-03-08 DIAGNOSIS — R14.2 BELCHING: ICD-10-CM

## 2025-03-08 NOTE — TELEPHONE ENCOUNTER
Care Due:                  Date            Visit Type   Department     Provider  --------------------------------------------------------------------------------                                MYCHART                              ANNUAL                              CHECKUP/PHY  MET INTERNAL  Last Visit: 05-      S            NEHA Montana  Next Visit: None Scheduled  None         None Found                                                            Last  Test          Frequency    Reason                     Performed    Due Date  --------------------------------------------------------------------------------    CMP.........  12 months..  atorvastatin, irbesartan.  03- 03-    Lipid Panel.  12 months..  atorvastatin.............  03- 03-    [employer] Embedded Care Due Messages. Reference number: [id number].   3/08/2025 6:11:16 AM CST

## 2025-03-09 NOTE — TELEPHONE ENCOUNTER
Refill Routing Note   Medication(s) are not appropriate for processing by [facility] for the following reason(s):        Outside of protocol    [facility] action(s):  Route     Requires labs : Yes      Medication Therapy Plan: PPI dose outside of [facility] protocol      Appointments  past 12m or future 3m with PCP    Date Provider   Last Visit   5/27/2024 Ata Montana MD   Next Visit   Visit date not found Ata Montana MD   ED visits in past 90 days: 0        Note composed:3:22 PM 03/09/2025

## 2025-03-10 RX ORDER — OMEPRAZOLE 40 MG/1
40 CAPSULE, DELAYED RELEASE ORAL
Qty: 180 CAPSULE | Refills: 1 | Status: SHIPPED | OUTPATIENT
Start: 2025-03-10 | End: 2026-03-10

## 2025-03-24 ENCOUNTER — PATIENT MESSAGE (OUTPATIENT)
Dept: INTERNAL MEDICINE | Facility: CLINIC | Age: 70
End: 2025-03-24
Payer: COMMERCIAL

## 2025-06-02 RX ORDER — ATORVASTATIN CALCIUM 40 MG/1
40 TABLET, FILM COATED ORAL DAILY
Qty: 90 TABLET | Refills: 0 | Status: SHIPPED | OUTPATIENT
Start: 2025-06-02

## 2025-06-02 RX ORDER — IRBESARTAN 75 MG/1
75 TABLET ORAL DAILY
Qty: 90 TABLET | Refills: 0 | Status: SHIPPED | OUTPATIENT
Start: 2025-06-02

## 2025-08-14 ENCOUNTER — TELEPHONE (OUTPATIENT)
Dept: SLEEP MEDICINE | Facility: OTHER | Age: 70
End: 2025-08-14
Payer: COMMERCIAL

## 2025-08-14 ENCOUNTER — LAB VISIT (OUTPATIENT)
Dept: LAB | Facility: HOSPITAL | Age: 70
End: 2025-08-14
Attending: INTERNAL MEDICINE
Payer: COMMERCIAL

## 2025-08-14 ENCOUNTER — OFFICE VISIT (OUTPATIENT)
Dept: INTERNAL MEDICINE | Facility: CLINIC | Age: 70
End: 2025-08-14
Payer: COMMERCIAL

## 2025-08-14 VITALS
HEART RATE: 76 BPM | TEMPERATURE: 98 F | OXYGEN SATURATION: 98 % | SYSTOLIC BLOOD PRESSURE: 100 MMHG | DIASTOLIC BLOOD PRESSURE: 72 MMHG | BODY MASS INDEX: 25.22 KG/M2 | WEIGHT: 180.13 LBS | HEIGHT: 71 IN

## 2025-08-14 DIAGNOSIS — I10 ESSENTIAL HYPERTENSION: Chronic | ICD-10-CM

## 2025-08-14 DIAGNOSIS — Z12.5 PROSTATE CANCER SCREENING: ICD-10-CM

## 2025-08-14 DIAGNOSIS — Z00.00 ANNUAL PHYSICAL EXAM: ICD-10-CM

## 2025-08-14 DIAGNOSIS — R41.840 CONCENTRATION DEFICIT: ICD-10-CM

## 2025-08-14 DIAGNOSIS — R53.83 FATIGUE, UNSPECIFIED TYPE: ICD-10-CM

## 2025-08-14 DIAGNOSIS — R47.01 APHASIA: ICD-10-CM

## 2025-08-14 DIAGNOSIS — K21.9 GASTROESOPHAGEAL REFLUX DISEASE, UNSPECIFIED WHETHER ESOPHAGITIS PRESENT: Chronic | ICD-10-CM

## 2025-08-14 DIAGNOSIS — I25.118 CORONARY ARTERY DISEASE OF NATIVE ARTERY OF NATIVE HEART WITH STABLE ANGINA PECTORIS: Chronic | ICD-10-CM

## 2025-08-14 DIAGNOSIS — K21.9 GASTROESOPHAGEAL REFLUX DISEASE WITHOUT ESOPHAGITIS: ICD-10-CM

## 2025-08-14 DIAGNOSIS — R41.3 OTHER AMNESIA: ICD-10-CM

## 2025-08-14 DIAGNOSIS — Z00.00 ANNUAL PHYSICAL EXAM: Primary | ICD-10-CM

## 2025-08-14 LAB
ABSOLUTE EOSINOPHIL (OHS): 0.08 K/UL
ABSOLUTE MONOCYTE (OHS): 0.52 K/UL (ref 0.3–1)
ABSOLUTE NEUTROPHIL COUNT (OHS): 3.96 K/UL (ref 1.8–7.7)
ALBUMIN SERPL BCP-MCNC: 4.3 G/DL (ref 3.5–5.2)
ALP SERPL-CCNC: 89 UNIT/L (ref 40–150)
ALT SERPL W/O P-5'-P-CCNC: 24 UNIT/L (ref 0–55)
ANION GAP (OHS): 10 MMOL/L (ref 8–16)
AST SERPL-CCNC: 29 UNIT/L (ref 0–50)
BASOPHILS # BLD AUTO: 0.04 K/UL
BASOPHILS NFR BLD AUTO: 0.7 %
BILIRUB SERPL-MCNC: 0.8 MG/DL (ref 0.1–1)
BUN SERPL-MCNC: 33 MG/DL (ref 8–23)
CALCIUM SERPL-MCNC: 9.1 MG/DL (ref 8.7–10.5)
CHLORIDE SERPL-SCNC: 106 MMOL/L (ref 95–110)
CHOLEST SERPL-MCNC: 169 MG/DL (ref 120–199)
CHOLEST/HDLC SERPL: 2.5 {RATIO} (ref 2–5)
CO2 SERPL-SCNC: 23 MMOL/L (ref 23–29)
CREAT SERPL-MCNC: 1.1 MG/DL (ref 0.5–1.4)
EAG (OHS): 103 MG/DL (ref 68–131)
ERYTHROCYTE [DISTWIDTH] IN BLOOD BY AUTOMATED COUNT: 12.9 % (ref 11.5–14.5)
GFR SERPLBLD CREATININE-BSD FMLA CKD-EPI: >60 ML/MIN/1.73/M2
GLUCOSE SERPL-MCNC: 98 MG/DL (ref 70–110)
HBA1C MFR BLD: 5.2 % (ref 4–5.6)
HCT VFR BLD AUTO: 42 % (ref 40–54)
HDLC SERPL-MCNC: 67 MG/DL (ref 40–75)
HDLC SERPL: 39.6 % (ref 20–50)
HGB BLD-MCNC: 13.9 GM/DL (ref 14–18)
IMM GRANULOCYTES # BLD AUTO: 0.02 K/UL (ref 0–0.04)
IMM GRANULOCYTES NFR BLD AUTO: 0.3 % (ref 0–0.5)
LDLC SERPL CALC-MCNC: 91.2 MG/DL (ref 63–159)
LYMPHOCYTES # BLD AUTO: 1.25 K/UL (ref 1–4.8)
MCH RBC QN AUTO: 31.6 PG (ref 27–31)
MCHC RBC AUTO-ENTMCNC: 33.1 G/DL (ref 32–36)
MCV RBC AUTO: 96 FL (ref 82–98)
NONHDLC SERPL-MCNC: 102 MG/DL
NUCLEATED RBC (/100WBC) (OHS): 0 /100 WBC
PLATELET # BLD AUTO: 197 K/UL (ref 150–450)
PMV BLD AUTO: 10 FL (ref 9.2–12.9)
POTASSIUM SERPL-SCNC: 4.1 MMOL/L (ref 3.5–5.1)
PROT SERPL-MCNC: 7.1 GM/DL (ref 6–8.4)
PSA SERPL-MCNC: 1.04 NG/ML
RBC # BLD AUTO: 4.4 M/UL (ref 4.6–6.2)
RELATIVE EOSINOPHIL (OHS): 1.4 %
RELATIVE LYMPHOCYTE (OHS): 21.3 % (ref 18–48)
RELATIVE MONOCYTE (OHS): 8.9 % (ref 4–15)
RELATIVE NEUTROPHIL (OHS): 67.4 % (ref 38–73)
SODIUM SERPL-SCNC: 139 MMOL/L (ref 136–145)
TRIGL SERPL-MCNC: 54 MG/DL (ref 30–150)
TSH SERPL-ACNC: 2.85 UIU/ML (ref 0.4–4)
WBC # BLD AUTO: 5.87 K/UL (ref 3.9–12.7)

## 2025-08-14 PROCEDURE — 83036 HEMOGLOBIN GLYCOSYLATED A1C: CPT

## 2025-08-14 PROCEDURE — 80061 LIPID PANEL: CPT

## 2025-08-14 PROCEDURE — 36415 COLL VENOUS BLD VENIPUNCTURE: CPT | Mod: PO

## 2025-08-14 PROCEDURE — 99999 PR PBB SHADOW E&M-EST. PATIENT-LVL IV: CPT | Mod: PBBFAC,,, | Performed by: INTERNAL MEDICINE

## 2025-08-14 PROCEDURE — 84443 ASSAY THYROID STIM HORMONE: CPT

## 2025-08-14 PROCEDURE — 85025 COMPLETE CBC W/AUTO DIFF WBC: CPT

## 2025-08-14 PROCEDURE — 84153 ASSAY OF PSA TOTAL: CPT

## 2025-08-14 PROCEDURE — 82040 ASSAY OF SERUM ALBUMIN: CPT

## 2025-08-14 RX ORDER — OMEPRAZOLE 40 MG/1
40 CAPSULE, DELAYED RELEASE ORAL
Qty: 180 CAPSULE | Refills: 3 | Status: SHIPPED | OUTPATIENT
Start: 2025-08-14 | End: 2026-08-14

## 2025-08-14 RX ORDER — SUCRALFATE 1 G/1
1 TABLET ORAL 2 TIMES DAILY
Qty: 60 TABLET | Refills: 3 | Status: SHIPPED | OUTPATIENT
Start: 2025-08-14

## 2025-08-14 RX ORDER — ATORVASTATIN CALCIUM 40 MG/1
40 TABLET, FILM COATED ORAL DAILY
Qty: 90 TABLET | Refills: 3 | Status: SHIPPED | OUTPATIENT
Start: 2025-08-14

## 2025-08-14 RX ORDER — IRBESARTAN 75 MG/1
75 TABLET ORAL DAILY
Qty: 90 TABLET | Refills: 3 | Status: SHIPPED | OUTPATIENT
Start: 2025-08-14

## 2025-08-22 ENCOUNTER — TELEPHONE (OUTPATIENT)
Dept: SLEEP MEDICINE | Facility: OTHER | Age: 70
End: 2025-08-22
Payer: COMMERCIAL

## 2025-08-25 ENCOUNTER — HOSPITAL ENCOUNTER (OUTPATIENT)
Dept: SLEEP MEDICINE | Facility: OTHER | Age: 70
Discharge: HOME | End: 2025-08-25
Attending: INTERNAL MEDICINE
Payer: COMMERCIAL

## 2025-08-25 DIAGNOSIS — R41.3 OTHER AMNESIA: ICD-10-CM

## 2025-08-25 DIAGNOSIS — R53.83 FATIGUE, UNSPECIFIED TYPE: ICD-10-CM

## 2025-08-25 DIAGNOSIS — R47.01 APHASIA: ICD-10-CM

## 2025-08-25 PROCEDURE — 95800 SLP STDY UNATTENDED: CPT

## 2025-08-26 PROBLEM — R47.01 APHASIA: Status: ACTIVE | Noted: 2025-08-26

## 2025-08-28 PROCEDURE — 95800 SLP STDY UNATTENDED: CPT | Mod: 26,,, | Performed by: INTERNAL MEDICINE
